# Patient Record
Sex: MALE | Race: WHITE
[De-identification: names, ages, dates, MRNs, and addresses within clinical notes are randomized per-mention and may not be internally consistent; named-entity substitution may affect disease eponyms.]

---

## 2019-05-02 ENCOUNTER — HOSPITAL ENCOUNTER (EMERGENCY)
Dept: HOSPITAL 95 - ER | Age: 44
LOS: 1 days | Discharge: HOME | End: 2019-05-03
Payer: MEDICAID

## 2019-05-02 VITALS — HEIGHT: 74 IN | WEIGHT: 209.99 LBS | BODY MASS INDEX: 26.95 KG/M2

## 2019-05-02 DIAGNOSIS — B37.0: Primary | ICD-10-CM

## 2019-05-02 DIAGNOSIS — Z79.899: ICD-10-CM

## 2019-05-02 DIAGNOSIS — F17.200: ICD-10-CM

## 2019-05-02 DIAGNOSIS — J02.9: ICD-10-CM

## 2021-12-03 ENCOUNTER — APPOINTMENT (OUTPATIENT)
Dept: RADIOLOGY | Facility: MEDICAL CENTER | Age: 46
DRG: 854 | End: 2021-12-03
Attending: EMERGENCY MEDICINE
Payer: MEDICAID

## 2021-12-03 ENCOUNTER — HOSPITAL ENCOUNTER (INPATIENT)
Facility: MEDICAL CENTER | Age: 46
LOS: 6 days | DRG: 854 | End: 2021-12-09
Attending: EMERGENCY MEDICINE | Admitting: STUDENT IN AN ORGANIZED HEALTH CARE EDUCATION/TRAINING PROGRAM
Payer: MEDICAID

## 2021-12-03 DIAGNOSIS — L03.119 CELLULITIS OF FOOT: ICD-10-CM

## 2021-12-03 DIAGNOSIS — M79.89 FOOT SWELLING: ICD-10-CM

## 2021-12-03 DIAGNOSIS — F29 PSYCHOSIS, UNSPECIFIED PSYCHOSIS TYPE (HCC): ICD-10-CM

## 2021-12-03 DIAGNOSIS — R00.0 TACHYCARDIA: ICD-10-CM

## 2021-12-03 DIAGNOSIS — M79.605 PAIN OF LEFT LOWER EXTREMITY: ICD-10-CM

## 2021-12-03 DIAGNOSIS — M54.50 ACUTE LEFT-SIDED LOW BACK PAIN WITHOUT SCIATICA: ICD-10-CM

## 2021-12-03 DIAGNOSIS — A41.9 SEPSIS, DUE TO UNSPECIFIED ORGANISM, UNSPECIFIED WHETHER ACUTE ORGAN DYSFUNCTION PRESENT (HCC): ICD-10-CM

## 2021-12-03 PROBLEM — E11.65 UNCONTROLLED DIABETES MELLITUS WITH HYPERGLYCEMIA (HCC): Status: ACTIVE | Noted: 2021-12-03

## 2021-12-03 PROBLEM — E87.20 LACTIC ACID ACIDOSIS: Status: ACTIVE | Noted: 2021-12-03

## 2021-12-03 PROBLEM — L03.90 CELLULITIS: Status: ACTIVE | Noted: 2021-12-03

## 2021-12-03 LAB
ALBUMIN SERPL BCP-MCNC: 2.6 G/DL (ref 3.2–4.9)
ALBUMIN/GLOB SERPL: 0.6 G/DL
ALP SERPL-CCNC: 93 U/L (ref 30–99)
ALT SERPL-CCNC: 25 U/L (ref 2–50)
AMPHET UR QL SCN: NEGATIVE
ANION GAP SERPL CALC-SCNC: 13 MMOL/L (ref 7–16)
APPEARANCE UR: ABNORMAL
AST SERPL-CCNC: 45 U/L (ref 12–45)
BACTERIA #/AREA URNS HPF: NEGATIVE /HPF
BARBITURATES UR QL SCN: NEGATIVE
BASOPHILS # BLD AUTO: 0.3 % (ref 0–1.8)
BASOPHILS # BLD: 0.03 K/UL (ref 0–0.12)
BENZODIAZ UR QL SCN: NEGATIVE
BILIRUB SERPL-MCNC: 0.2 MG/DL (ref 0.1–1.5)
BILIRUB UR QL STRIP.AUTO: NEGATIVE
BUN SERPL-MCNC: 22 MG/DL (ref 8–22)
BZE UR QL SCN: NEGATIVE
CALCIUM SERPL-MCNC: 8.1 MG/DL (ref 8.5–10.5)
CANNABINOIDS UR QL SCN: POSITIVE
CHLORIDE SERPL-SCNC: 104 MMOL/L (ref 96–112)
CO2 SERPL-SCNC: 19 MMOL/L (ref 20–33)
COLOR UR: ABNORMAL
CREAT SERPL-MCNC: 0.71 MG/DL (ref 0.5–1.4)
CRP SERPL HS-MCNC: 7.17 MG/DL (ref 0–0.75)
EOSINOPHIL # BLD AUTO: 0.31 K/UL (ref 0–0.51)
EOSINOPHIL NFR BLD: 3 % (ref 0–6.9)
EPI CELLS #/AREA URNS HPF: ABNORMAL /HPF
ERYTHROCYTE [DISTWIDTH] IN BLOOD BY AUTOMATED COUNT: 46 FL (ref 35.9–50)
ETHANOL BLD-MCNC: <10.1 MG/DL (ref 0–10)
GLOBULIN SER CALC-MCNC: 4.6 G/DL (ref 1.9–3.5)
GLUCOSE BLD-MCNC: 137 MG/DL (ref 65–99)
GLUCOSE BLD-MCNC: 182 MG/DL (ref 65–99)
GLUCOSE SERPL-MCNC: 203 MG/DL (ref 65–99)
GLUCOSE UR STRIP.AUTO-MCNC: NEGATIVE MG/DL
HCT VFR BLD AUTO: 34.9 % (ref 42–52)
HGB BLD-MCNC: 11.3 G/DL (ref 14–18)
HYALINE CASTS #/AREA URNS LPF: ABNORMAL /LPF
IMM GRANULOCYTES # BLD AUTO: 0.08 K/UL (ref 0–0.11)
IMM GRANULOCYTES NFR BLD AUTO: 0.8 % (ref 0–0.9)
KETONES UR STRIP.AUTO-MCNC: ABNORMAL MG/DL
LACTATE BLD-SCNC: 0.6 MMOL/L (ref 0.5–2)
LACTATE BLD-SCNC: 3.5 MMOL/L (ref 0.5–2)
LEUKOCYTE ESTERASE UR QL STRIP.AUTO: NEGATIVE
LYMPHOCYTES # BLD AUTO: 3.24 K/UL (ref 1–4.8)
LYMPHOCYTES NFR BLD: 31.9 % (ref 22–41)
MAGNESIUM SERPL-MCNC: 1.8 MG/DL (ref 1.5–2.5)
MCH RBC QN AUTO: 28.3 PG (ref 27–33)
MCHC RBC AUTO-ENTMCNC: 32.4 G/DL (ref 33.7–35.3)
MCV RBC AUTO: 87.5 FL (ref 81.4–97.8)
METHADONE UR QL SCN: NEGATIVE
MICRO URNS: ABNORMAL
MONOCYTES # BLD AUTO: 0.73 K/UL (ref 0–0.85)
MONOCYTES NFR BLD AUTO: 7.2 % (ref 0–13.4)
MUCOUS THREADS #/AREA URNS HPF: ABNORMAL /HPF
NEUTROPHILS # BLD AUTO: 5.78 K/UL (ref 1.82–7.42)
NEUTROPHILS NFR BLD: 56.8 % (ref 44–72)
NITRITE UR QL STRIP.AUTO: NEGATIVE
NRBC # BLD AUTO: 0 K/UL
NRBC BLD-RTO: 0 /100 WBC
OPIATES UR QL SCN: POSITIVE
OXYCODONE UR QL SCN: NEGATIVE
PCP UR QL SCN: NEGATIVE
PH UR STRIP.AUTO: 5 [PH] (ref 5–8)
PLATELET # BLD AUTO: 336 K/UL (ref 164–446)
PMV BLD AUTO: 10.4 FL (ref 9–12.9)
POTASSIUM SERPL-SCNC: 4.3 MMOL/L (ref 3.6–5.5)
PROCALCITONIN SERPL-MCNC: 0.18 NG/ML
PROCALCITONIN SERPL-MCNC: 0.19 NG/ML
PROPOXYPH UR QL SCN: NEGATIVE
PROT SERPL-MCNC: 7.2 G/DL (ref 6–8.2)
PROT UR QL STRIP: 100 MG/DL
RBC # BLD AUTO: 3.99 M/UL (ref 4.7–6.1)
RBC # URNS HPF: ABNORMAL /HPF
RBC UR QL AUTO: NEGATIVE
SARS-COV-2 RNA RESP QL NAA+PROBE: NOTDETECTED
SODIUM SERPL-SCNC: 136 MMOL/L (ref 135–145)
SP GR UR STRIP.AUTO: 1.03
SPECIMEN SOURCE: NORMAL
UROBILINOGEN UR STRIP.AUTO-MCNC: 1 MG/DL
WBC # BLD AUTO: 10.2 K/UL (ref 4.8–10.8)
WBC #/AREA URNS HPF: ABNORMAL /HPF

## 2021-12-03 PROCEDURE — A9270 NON-COVERED ITEM OR SERVICE: HCPCS | Performed by: INTERNAL MEDICINE

## 2021-12-03 PROCEDURE — 770006 HCHG ROOM/CARE - MED/SURG/GYN SEMI*

## 2021-12-03 PROCEDURE — 96365 THER/PROPH/DIAG IV INF INIT: CPT

## 2021-12-03 PROCEDURE — 700111 HCHG RX REV CODE 636 W/ 250 OVERRIDE (IP): Performed by: INTERNAL MEDICINE

## 2021-12-03 PROCEDURE — 83735 ASSAY OF MAGNESIUM: CPT

## 2021-12-03 PROCEDURE — 80307 DRUG TEST PRSMV CHEM ANLYZR: CPT

## 2021-12-03 PROCEDURE — 71045 X-RAY EXAM CHEST 1 VIEW: CPT

## 2021-12-03 PROCEDURE — 80053 COMPREHEN METABOLIC PANEL: CPT

## 2021-12-03 PROCEDURE — 73610 X-RAY EXAM OF ANKLE: CPT | Mod: LT

## 2021-12-03 PROCEDURE — 86140 C-REACTIVE PROTEIN: CPT

## 2021-12-03 PROCEDURE — U0003 INFECTIOUS AGENT DETECTION BY NUCLEIC ACID (DNA OR RNA); SEVERE ACUTE RESPIRATORY SYNDROME CORONAVIRUS 2 (SARS-COV-2) (CORONAVIRUS DISEASE [COVID-19]), AMPLIFIED PROBE TECHNIQUE, MAKING USE OF HIGH THROUGHPUT TECHNOLOGIES AS DESCRIBED BY CMS-2020-01-R: HCPCS

## 2021-12-03 PROCEDURE — 700105 HCHG RX REV CODE 258: Performed by: INTERNAL MEDICINE

## 2021-12-03 PROCEDURE — 85025 COMPLETE CBC W/AUTO DIFF WBC: CPT

## 2021-12-03 PROCEDURE — 73630 X-RAY EXAM OF FOOT: CPT | Mod: LT

## 2021-12-03 PROCEDURE — 700102 HCHG RX REV CODE 250 W/ 637 OVERRIDE(OP): Performed by: STUDENT IN AN ORGANIZED HEALTH CARE EDUCATION/TRAINING PROGRAM

## 2021-12-03 PROCEDURE — 96372 THER/PROPH/DIAG INJ SC/IM: CPT

## 2021-12-03 PROCEDURE — 700111 HCHG RX REV CODE 636 W/ 250 OVERRIDE (IP): Performed by: STUDENT IN AN ORGANIZED HEALTH CARE EDUCATION/TRAINING PROGRAM

## 2021-12-03 PROCEDURE — 96375 TX/PRO/DX INJ NEW DRUG ADDON: CPT

## 2021-12-03 PROCEDURE — 99285 EMERGENCY DEPT VISIT HI MDM: CPT

## 2021-12-03 PROCEDURE — 81001 URINALYSIS AUTO W/SCOPE: CPT

## 2021-12-03 PROCEDURE — 700102 HCHG RX REV CODE 250 W/ 637 OVERRIDE(OP): Performed by: INTERNAL MEDICINE

## 2021-12-03 PROCEDURE — 700105 HCHG RX REV CODE 258: Performed by: EMERGENCY MEDICINE

## 2021-12-03 PROCEDURE — 87086 URINE CULTURE/COLONY COUNT: CPT

## 2021-12-03 PROCEDURE — 84145 PROCALCITONIN (PCT): CPT | Mod: 91

## 2021-12-03 PROCEDURE — 700105 HCHG RX REV CODE 258: Performed by: STUDENT IN AN ORGANIZED HEALTH CARE EDUCATION/TRAINING PROGRAM

## 2021-12-03 PROCEDURE — 99223 1ST HOSP IP/OBS HIGH 75: CPT | Performed by: STUDENT IN AN ORGANIZED HEALTH CARE EDUCATION/TRAINING PROGRAM

## 2021-12-03 PROCEDURE — 82077 ASSAY SPEC XCP UR&BREATH IA: CPT

## 2021-12-03 PROCEDURE — 36415 COLL VENOUS BLD VENIPUNCTURE: CPT

## 2021-12-03 PROCEDURE — 87040 BLOOD CULTURE FOR BACTERIA: CPT

## 2021-12-03 PROCEDURE — 82962 GLUCOSE BLOOD TEST: CPT

## 2021-12-03 PROCEDURE — U0005 INFEC AGEN DETEC AMPLI PROBE: HCPCS

## 2021-12-03 PROCEDURE — 700111 HCHG RX REV CODE 636 W/ 250 OVERRIDE (IP): Performed by: EMERGENCY MEDICINE

## 2021-12-03 PROCEDURE — 83605 ASSAY OF LACTIC ACID: CPT | Mod: 91

## 2021-12-03 RX ORDER — DIPHENHYDRAMINE HYDROCHLORIDE 50 MG/ML
50 INJECTION INTRAMUSCULAR; INTRAVENOUS ONCE
Status: COMPLETED | OUTPATIENT
Start: 2021-12-03 | End: 2021-12-03

## 2021-12-03 RX ORDER — SODIUM CHLORIDE, SODIUM LACTATE, POTASSIUM CHLORIDE, CALCIUM CHLORIDE 600; 310; 30; 20 MG/100ML; MG/100ML; MG/100ML; MG/100ML
INJECTION, SOLUTION INTRAVENOUS CONTINUOUS
Status: DISCONTINUED | OUTPATIENT
Start: 2021-12-03 | End: 2021-12-04

## 2021-12-03 RX ORDER — MORPHINE SULFATE 4 MG/ML
4 INJECTION INTRAVENOUS ONCE
Status: COMPLETED | OUTPATIENT
Start: 2021-12-03 | End: 2021-12-03

## 2021-12-03 RX ORDER — LORAZEPAM 2 MG/ML
0.5 INJECTION INTRAMUSCULAR ONCE
Status: ACTIVE | OUTPATIENT
Start: 2021-12-03 | End: 2021-12-04

## 2021-12-03 RX ORDER — HALOPERIDOL 5 MG/ML
5 INJECTION INTRAMUSCULAR ONCE
Status: COMPLETED | OUTPATIENT
Start: 2021-12-03 | End: 2021-12-03

## 2021-12-03 RX ORDER — SODIUM CHLORIDE 9 MG/ML
2000 INJECTION, SOLUTION INTRAVENOUS ONCE
Status: COMPLETED | OUTPATIENT
Start: 2021-12-03 | End: 2021-12-03

## 2021-12-03 RX ORDER — OXYCODONE HYDROCHLORIDE 5 MG/1
5 TABLET ORAL EVERY 4 HOURS PRN
Status: DISCONTINUED | OUTPATIENT
Start: 2021-12-03 | End: 2021-12-03

## 2021-12-03 RX ORDER — KETOROLAC TROMETHAMINE 30 MG/ML
30 INJECTION, SOLUTION INTRAMUSCULAR; INTRAVENOUS EVERY 6 HOURS PRN
Status: DISPENSED | OUTPATIENT
Start: 2021-12-03 | End: 2021-12-08

## 2021-12-03 RX ORDER — HEPARIN SODIUM 5000 [USP'U]/ML
5000 INJECTION, SOLUTION INTRAVENOUS; SUBCUTANEOUS EVERY 8 HOURS
Status: DISCONTINUED | OUTPATIENT
Start: 2021-12-03 | End: 2021-12-09 | Stop reason: HOSPADM

## 2021-12-03 RX ORDER — LORAZEPAM 2 MG/ML
2 INJECTION INTRAMUSCULAR ONCE
Status: COMPLETED | OUTPATIENT
Start: 2021-12-03 | End: 2021-12-03

## 2021-12-03 RX ADMIN — SODIUM CHLORIDE, POTASSIUM CHLORIDE, SODIUM LACTATE AND CALCIUM CHLORIDE: 600; 310; 30; 20 INJECTION, SOLUTION INTRAVENOUS at 05:39

## 2021-12-03 RX ADMIN — KETOROLAC TROMETHAMINE 30 MG: 30 INJECTION, SOLUTION INTRAMUSCULAR; INTRAVENOUS at 21:29

## 2021-12-03 RX ADMIN — HALOPERIDOL LACTATE 5 MG: 5 INJECTION, SOLUTION INTRAMUSCULAR at 02:33

## 2021-12-03 RX ADMIN — VANCOMYCIN HYDROCHLORIDE 1750 MG: 500 INJECTION, POWDER, LYOPHILIZED, FOR SOLUTION INTRAVENOUS at 13:23

## 2021-12-03 RX ADMIN — MORPHINE SULFATE 4 MG: 4 INJECTION INTRAVENOUS at 02:33

## 2021-12-03 RX ADMIN — AMPICILLIN SODIUM AND SULBACTAM SODIUM 3 G: 2; 1 INJECTION, POWDER, FOR SOLUTION INTRAMUSCULAR; INTRAVENOUS at 02:35

## 2021-12-03 RX ADMIN — VANCOMYCIN HYDROCHLORIDE 750 MG: 500 INJECTION, POWDER, LYOPHILIZED, FOR SOLUTION INTRAVENOUS at 21:30

## 2021-12-03 RX ADMIN — HEPARIN SODIUM 5000 UNITS: 5000 INJECTION, SOLUTION INTRAVENOUS; SUBCUTANEOUS at 21:29

## 2021-12-03 RX ADMIN — LORAZEPAM 2 MG: 2 INJECTION INTRAMUSCULAR; INTRAVENOUS at 02:33

## 2021-12-03 RX ADMIN — HEPARIN SODIUM 5000 UNITS: 5000 INJECTION, SOLUTION INTRAVENOUS; SUBCUTANEOUS at 13:29

## 2021-12-03 RX ADMIN — DIPHENHYDRAMINE HYDROCHLORIDE 50 MG: 50 INJECTION INTRAMUSCULAR; INTRAVENOUS at 02:32

## 2021-12-03 RX ADMIN — AMPICILLIN SODIUM AND SULBACTAM SODIUM 3 G: 2; 1 INJECTION, POWDER, FOR SOLUTION INTRAMUSCULAR; INTRAVENOUS at 09:58

## 2021-12-03 RX ADMIN — AMPICILLIN SODIUM AND SULBACTAM SODIUM 3 G: 2; 1 INJECTION, POWDER, FOR SOLUTION INTRAMUSCULAR; INTRAVENOUS at 17:53

## 2021-12-03 RX ADMIN — SODIUM CHLORIDE 2000 ML: 9 INJECTION, SOLUTION INTRAVENOUS at 02:12

## 2021-12-03 RX ADMIN — HEPARIN SODIUM 5000 UNITS: 5000 INJECTION, SOLUTION INTRAVENOUS; SUBCUTANEOUS at 05:50

## 2021-12-03 ASSESSMENT — ENCOUNTER SYMPTOMS
EYE DISCHARGE: 0
PALPITATIONS: 0
FEVER: 0
NAUSEA: 0
ABDOMINAL PAIN: 0
MYALGIAS: 0
CHILLS: 0
ORTHOPNEA: 0
NECK PAIN: 0
VOMITING: 0
EYE PAIN: 0

## 2021-12-03 ASSESSMENT — LIFESTYLE VARIABLES
DO YOU DRINK ALCOHOL: YES
DOES PATIENT WANT TO STOP DRINKING: NO

## 2021-12-03 ASSESSMENT — PAIN DESCRIPTION - PAIN TYPE: TYPE: ACUTE PAIN

## 2021-12-03 NOTE — H&P
Hospital Medicine History & Physical Note    Date of Service  12/3/2021    Primary Care Physician  No primary care provider on file.    Consultants  None    Code Status  Full Code    Chief Complaint  Chief Complaint   Patient presents with   • Foot Pain   • Foot Swelling   • Psych Eval       History of Presenting Illness  Sultan Grady is a 89 y.o. male who presented 12/3/2021 with psychosis and lower leg wound.  Patient is currently sedated at bedside as he has been agitated and aggressive in the ED prior to my interview with him.  Unable to provide a reliable history at this time, history taken by ER chart.  Patient has a history of IV drug use.  He does not want to state his real name and birthdate.  He states that he has been discharged from [facility] sometime ago for pain discomfort in his left foot which he had surgery.  Initially in the ER, he was screaming that he had a very severe leg wound and that he was septic however provides no other history.  States that he has history of HIV and IBS.     In the ED, he was febrile and tachycardic.  Remarkable labs include normocytic anemia, sugar 203, lactic acid 3.5.  UA shows pyuria, however no suggestion of UTI.  CRP 7.17.  X-ray of the ankle shows soft tissue swelling of the ankle.  Patient was given fluids and Unasyn before being endorsed to medicine.    I discussed the plan of care with patient.    Review of Systems  ROS  Unable to assess due to acuity of condition    Past Medical History  Unable to assess due to acuity of condition    Surgical History  No pertinent surgical history      Family History   Family history reviewed with patient. There is no family history that is pertinent to the chief complaint.     Social History  Unable to assess due to acuity of condition       Allergies  NKDA    Medications  None       Physical Exam  Temp:  [37.3 °C (99.1 °F)-38.2 °C (100.7 °F)] 37.3 °C (99.1 °F)  Pulse:  [] 77  Resp:  [18-26] 18  BP:  ()/() 97/56  SpO2:  [91 %-98 %] 97 %  BP: 97/56   Temp: 37.3 °C (99.1 °F)   Pulse: 77   Resp: 18   SpO2: 97 %       Physical Exam  Constitutional:       Appearance: He is normal weight.      Comments: Sedated, unable to provide history, however does respond to questions with yes and no   HENT:      Head: Normocephalic.      Nose: Nose normal.      Mouth/Throat:      Mouth: Mucous membranes are moist.   Eyes:      Pupils: Pupils are equal, round, and reactive to light.   Cardiovascular:      Rate and Rhythm: Regular rhythm. Tachycardia present.      Pulses: Normal pulses.   Pulmonary:      Effort: Pulmonary effort is normal.      Breath sounds: Normal breath sounds.   Abdominal:      General: Abdomen is flat. Bowel sounds are normal.      Palpations: Abdomen is soft.   Musculoskeletal:      Cervical back: Normal range of motion.      Comments: Left lower extremity warm to touch.  Area of sutures and erythema noted on lateral aspect of left ankle.  No foul odor.  No drainage.  Not tender to touch.   Skin:     General: Skin is warm.   Neurological:      Comments: Sedated         Laboratory:  Recent Labs     12/03/21  0143   WBC 10.2   RBC 3.99*   HEMOGLOBIN 11.3*   HEMATOCRIT 34.9*   MCV 87.5   MCH 28.3   MCHC 32.4*   RDW 46.0   PLATELETCT 336   MPV 10.4     Recent Labs     12/03/21  0143   SODIUM 136   POTASSIUM 4.3   CHLORIDE 104   CO2 19*   GLUCOSE 203*   BUN 22   CREATININE 0.71   CALCIUM 8.1*     Recent Labs     12/03/21  0143   ALTSGPT 25   ASTSGOT 45   ALKPHOSPHAT 93   TBILIRUBIN 0.2   GLUCOSE 203*         No results for input(s): NTPROBNP in the last 72 hours.      No results for input(s): TROPONINT in the last 72 hours.    Imaging:  DX-ANKLE 3+ VIEWS LEFT   Final Result         1.  No acute traumatic bony injury.   2.  Soft tissue swelling of the ankle appear         DX-FOOT-COMPLETE 3+ LEFT   Final Result         1.  No acute traumatic bony injury.   2.  Forefoot soft tissue swung       DX-CHEST-PORTABLE (1 VIEW)   Final Result         1.  No focal infiltrates.   2.  Perihilar interstitial prominence and bronchial wall cuffing, appearance suggests changes of underlying bronchial inflammation, consider bronchitis.          no X-Ray or EKG requiring interpretation    Assessment/Plan:  I anticipate this patient is appropriate for observation status at this time.    * Cellulitis- (present on admission)  Assessment & Plan  Admit patient to medical floor  Unasyn  Fluids  Procalcitonin negative  CRP elevated  Pain medication  Blood cultures  Consider TTE for endocarditis as patient has hx of IVDU    Uncontrolled diabetes mellitus with hyperglycemia (HCC)  Assessment & Plan  RISS      Lactic acid acidosis  Assessment & Plan  From sepsis  Trend lactic acid  Fluids    Sepsis (HCC)  Assessment & Plan  This is Sepsis Present on admission  SIRS criteria identified on my evaluation include: Tachycardia, with heart rate greater than 90 BPM and Leukocytosis, with WBC greater than 12,000  Source is Cellulitis  Sepsis protocol initiated  Fluid resuscitation ordered per protocol  IV antibiotics as appropriate for source of sepsis  While organ dysfunction may be noted elsewhere in this problem list or in the chart, degree of organ dysfunction does not meet CMS criteria for severe sepsis              VTE prophylaxis: heparin ppx

## 2021-12-03 NOTE — ED NOTES
Pt removed IV from Rt forearm. Pt now laying on floor with his dog. Pt refusing to answer RN's questions.

## 2021-12-03 NOTE — ED NOTES
Pt resting in room, easy, equal chest rise and fall. Pt remains calm and cooperative. Admitting hospitalist at bedside.

## 2021-12-03 NOTE — ASSESSMENT & PLAN NOTE
Denies history of diabetes.  Hyperglycemic on admission  A1c ordered.  Continue SSI and accuchecks

## 2021-12-03 NOTE — ASSESSMENT & PLAN NOTE
Continue cefazolin  Procalcitonin negative  CRP elevated 7.17   Patient reports allergies to opiates.   Tylenol and Toradol for pain  Preliminary Blood cultures NGTD- continue to monitor.   Patient has had recent surgery on his left foot at [facility] but patient is a poor historian and rush provide more information, [facility] records pending  MRI left foot done- FHL sheath fluid may communicate with the medium-sized tibiotalar joint effusion  POD #1 s/p ID, hemovac in place

## 2021-12-03 NOTE — ED NOTES
Pt moved back to bed. Pt rehooked to monitor. Pt told RN that his dogs name is Rod and he is a Lab/Husky/Malamute mix. Pt states that his foot is hurting and would like some pain medications. Attending physician notified.

## 2021-12-03 NOTE — ED NOTES
Pt refused oxycodone stating he has allergies to opiates. Pt stated he does take morphine and dilaudid. When informed that morphine and dilaudid are also opiates, Pt stated that he tolerates them. Hospitalist informed.

## 2021-12-03 NOTE — ED NOTES
Report given to EDWINA Salas. Awaiting on bed in room prior to transport. Per admitting RN, dog  OK on floor.

## 2021-12-03 NOTE — ASSESSMENT & PLAN NOTE
This is Sepsis Present on admission  SIRS criteria identified on my evaluation include: Tachycardia, with heart rate greater than 90 BPM and Leukocytosis, with WBC greater than 12,000  Source is Cellulitis  Sepsis protocol initiated  Fluid resuscitation ordered per protocol  IV antibiotics as appropriate for source of sepsis  While organ dysfunction may be noted elsewhere in this problem list or in the chart, degree of organ dysfunction does not meet CMS criteria for severe sepsis    resolved

## 2021-12-03 NOTE — ED NOTES
Med rec unable to be obtained at this time, attempted to interview pt again at this time, pt not arousable to verbal stimuli to participate in interview.     No demographics or home pharmacy listed. Pt no longer under alias name.

## 2021-12-03 NOTE — ED NOTES
Pt sleeping on floor with his dog. Easy, equal chest rise and fall. Pt remains calm and cooperative.

## 2021-12-03 NOTE — ED TRIAGE NOTES
"Chief Complaint   Patient presents with   • Foot Pain   • Foot Swelling   • Psych Eval       BIB EMS to [facility], pt on monitor and in gown, labs drawn and sent. Pt called EMS reporting \"I have an infection in my blood! I have sepsis!\" Per EMS patient c/o of L foot pain and pt has stiches to L foot. Pt reports he has surgery but unable to state when. Pt unable to report anymore information. Pt became combative with EMS and EMS administered 200 of Ketamine. Pt had a fever of 101.2F, EMS administered 1000 mg of tylenol. Pt is A/O x 2, combative, yelling and unable to answer all questions. Upon arrival pt is hypertensive, has a fever and is tachycardic, and tachypenic. GCS 14.     Hx of HIV, IBS.    Medications given en route:200 of ketamine and tylenol 1000 mg PO    ERP at bedside. Security at bedside.    BP (!) 178/114   Pulse 120   Resp 22   Ht 1.778 m (5' 10\")   Wt 72.6 kg (160 lb)   SpO2 98%   BMI 22.96 kg/m²   "

## 2021-12-03 NOTE — ED NOTES
Pt sleeping in gurney with service dog, on monitor. Even and unlabored respirations, easily awakens to stimuli

## 2021-12-03 NOTE — PROGRESS NOTES
[facility] Medicine Daily Progress Note    Date of Service  12/3/2021    Chief Complaint  Hermann Ruffin is a 45 y.o. male admitted 12/3/2021 with foot pain and swelling     Hospital Course  This is a 44 y/o M who presented 12/3/2021 with lower leg wound.  Patient has a history of IV drug use He states that he has been discharged from [facility] sometime ago for pain discomfort in his left foot which he had surgery but will not elaborate why he had a surgery.    Patient here in ER found to have a lower extremity cellulitis around the area where previous surgery was done.  Patient admitted for further treatment.     Interval Problem Update  Patient seen and examined, resting in bed, poor historian. Complains of foot pain. No nausea or vomiting.  Cont on IV abx. Will check MRI of the foot.     I have personally seen and examined the patient at bedside. I discussed the plan of care with patient and bedside RN.    Consultants/Specialty  None     Code Status  Full Code    Disposition  Patient is not medically cleared.   Anticipate discharge to TBD .  I have placed the appropriate orders for post-discharge needs.    Review of Systems  Review of Systems   Constitutional: Negative for chills and fever.   HENT: Negative for ear discharge and ear pain.    Eyes: Negative for pain and discharge.   Cardiovascular: Negative for chest pain, palpitations and orthopnea.   Gastrointestinal: Negative for abdominal pain, nausea and vomiting.   Genitourinary: Negative for dysuria, frequency and urgency.   Musculoskeletal: Positive for joint pain. Negative for myalgias and neck pain.        Physical Exam  Temp:  [37.3 °C (99.1 °F)-38.2 °C (100.7 °F)] 37.3 °C (99.1 °F)  Pulse:  [] 63  Resp:  [18-26] 18  BP: ()/() 115/89  SpO2:  [91 %-100 %] 100 %    Physical Exam  Vitals and nursing note reviewed.   Constitutional:       Appearance: He is normal weight.      Comments: Sedated, unable to provide history, however  does respond to questions with yes and no   HENT:      Head: Normocephalic.      Nose: Nose normal.      Mouth/Throat:      Mouth: Mucous membranes are moist.   Eyes:      Pupils: Pupils are equal, round, and reactive to light.   Cardiovascular:      Rate and Rhythm: Regular rhythm. Tachycardia present.      Pulses: Normal pulses.   Pulmonary:      Effort: Pulmonary effort is normal.      Breath sounds: Normal breath sounds.   Abdominal:      General: Abdomen is flat. Bowel sounds are normal. There is no distension.      Palpations: Abdomen is soft.      Tenderness: There is no abdominal tenderness. There is no guarding or rebound.   Musculoskeletal:      Cervical back: Normal range of motion.      Comments: Left lower extremity warm to touch.  Area of sutures and erythema noted on lateral aspect of left ankle.  No foul odor.  No drainage.  Not tender to touch.   Skin:     General: Skin is warm.   Neurological:      Comments: Sedated         Fluids    Intake/Output Summary (Last 24 hours) at 12/3/2021 1032  Last data filed at 12/3/2021 0448  Gross per 24 hour   Intake 1000 ml   Output --   Net 1000 ml       Laboratory  Recent Labs     12/03/21  0143   WBC 10.2   RBC 3.99*   HEMOGLOBIN 11.3*   HEMATOCRIT 34.9*   MCV 87.5   MCH 28.3   MCHC 32.4*   RDW 46.0   PLATELETCT 336   MPV 10.4     Recent Labs     12/03/21  0143   SODIUM 136   POTASSIUM 4.3   CHLORIDE 104   CO2 19*   GLUCOSE 203*   BUN 22   CREATININE 0.71   CALCIUM 8.1*                   Imaging  DX-ANKLE 3+ VIEWS LEFT   Final Result         1.  No acute traumatic bony injury.   2.  Soft tissue swelling of the ankle appear         DX-FOOT-COMPLETE 3+ LEFT   Final Result         1.  No acute traumatic bony injury.   2.  Forefoot soft tissue swung      DX-CHEST-PORTABLE (1 VIEW)   Final Result         1.  No focal infiltrates.   2.  Perihilar interstitial prominence and bronchial wall cuffing, appearance suggests changes of underlying bronchial inflammation,  consider bronchitis.           Assessment/Plan  * Cellulitis- (present on admission)  Assessment & Plan  Cont on vancomycin and Unasyn   IV fluids   Procalcitonin negative  CRP elevated  Pain medication  Blood cultures  patient has had a surgery on his left foot at [facility] but patient is a poor historian and rush provide more information  Will request records from [facility].  In the mean time will order an MRI of the foot     Uncontrolled diabetes mellitus with hyperglycemia (HCC)  Assessment & Plan  Cont on SSI and accuchecks       Lactic acid acidosis  Assessment & Plan  From sepsis  Trended down       Sepsis (HCC)  Assessment & Plan  This is Sepsis Present on admission  SIRS criteria identified on my evaluation include: Tachycardia, with heart rate greater than 90 BPM and Leukocytosis, with WBC greater than 12,000  Source is Cellulitis  Sepsis protocol initiated  Fluid resuscitation ordered per protocol  IV antibiotics as appropriate for source of sepsis  While organ dysfunction may be noted elsewhere in this problem list or in the chart, degree of organ dysfunction does not meet CMS criteria for severe sepsis             VTE prophylaxis: heparin ppx    I have performed a physical exam and reviewed and updated ROS and Plan today (12/3/2021). In review of yesterday's note (12/2/2021), there are no changes except as documented above.

## 2021-12-03 NOTE — ED PROVIDER NOTES
"ED Provider Note    CHIEF COMPLAINT  Chief Complaint   Patient presents with   • Foot Pain   • Foot Swelling   • Psych Eval       HPI  Patient is a mid 40s-year-old male who presents emergency room brought in by EMS personnel for psychosis and concerns of possible sepsis.  The patient was reported screaming that he had \" a severe leg wound\" and that he had \"sepsis.\"  He reports that he has a history of IV drug use, has been in several hospitals and was told very left prior to completion of therapy.  He is unable to provide the name and birthdate it comes up in her system and is registered under a centimeter.  Patient states that he was discharged from [facility] and has had ongoing pain and discomfort in his left foot for which she has had surgery on.  He believes that he has an infection both in the wound and in his blood and has been irate.  Per EMS personnel he had a fever 101.2 and was noted to be tachycardic.  They administered 200 mg of ketamine and 1 g of Tylenol.  He is slightly agitated and aggressive but GCS is 15.  He reports having history of HIV and IBS.  Can provide no other health history.  Currently having moderate pain, he says he feels anxious, he says he needs \"help or also going to die.\"    PPE Note: I personally donned full PPE for all patient encounters during this visit, including being clean-shaven with an N95 respirator mask, gloves, and goggles.     REVIEW OF SYSTEMS  See HPI, limited review of systems due to the patient's tangentiality3    PAST MEDICAL HISTORY   unknown    SOCIAL HISTORY  Social History     Tobacco Use   • Smoking status: Not on file   • Smokeless tobacco: Not on file   Substance and Sexual Activity   • Alcohol use: Not on file   • Drug use: Not on file   • Sexual activity: Not on file     SURGICAL HISTORY  patient denies any surgical history    CURRENT MEDICATIONS  Home Medications    **Home medications have not yet been reviewed for this encounter**   " "    ALLERGIES  Not on File    PHYSICAL EXAM  VITAL SIGNS: BP 92/55   Pulse 91   Temp 38.2 °C (100.7 °F) (Temporal)   Resp 25   Ht 1.778 m (5' 10\")   Wt 72.6 kg (160 lb)   SpO2 95%   BMI 22.96 kg/m²    Pulse ox interpretation: I interpret this pulse ox as normal.  Genl: disheveled M, sitting in gurney uncomfortably, speaking is pressured, appears in moderate distress   Head: NC/AT   ENT: Mucous membranes dry, posterior pharynx clear, uvula midline, nares patent bilaterally   Eyes: Normal sclera, pupils equal round reactive to light  Neck: Supple, FROM, no LAD appreciated   Pulmonary: Lungs are clear to auscultation bilaterally  Chest: No TTP  CV:  tachycardia, no murmur appreciated, pulses 2+ in both upper and lower extremities,  Abdomen: soft, NT/ND; no rebound/guarding, no masses palpated, no HSM   : no CVA or suprapubic tenderness  Musculoskeletal: Pain free ROM of the neck. Moving upper and lower extremities and spontaneous in coordinated fashion  Left Lower Extremity  - Skin: Healing laceration sutures in place on the inferior aspect. Granulation tissue was also noted and swelling of the dorsum of the foot and global tenderness is appreciated. No abrasions, lacerations or ecchymosis  - Motor: Full range of motion of the hip and knee, limited range of motion of the ankle secondary to pain. Engages the flexors and extensors for dorsal and plantar flexion. EHL/FHL is intact   - Sensation intact to superficial/deep peroneal, tibial, saphenous, sural nerves  - 2+ dorsalis pedis and posterior tibialis, cap refill < 2 seconds x 5 digits  Neuro: A&Ox3 (person, place, situation), speech pressured but fluent, gait not assessed, no focal deficits appreciated, No cerebellar signs Sensation is grossly intact in the distal upper and lower extremities.  5/5 strength in  and dorsiflexion/plantar flexion of the ankles  Psych: Patient has pressured speech, tangentiality, no acute SI or HI, does not have any active " delusions.  Does appear to be responding to internal stimuli  Skin: No rash or lesions.  No pallor or jaundice.  No cyanosis.  Warm and dry.     COURSE & MEDICAL DECISION MAKING  DX-ANKLE 3+ VIEWS LEFT   Final Result         1.  No acute traumatic bony injury.   2.  Soft tissue swelling of the ankle appear         DX-FOOT-COMPLETE 3+ LEFT   Final Result         1.  No acute traumatic bony injury.   2.  Forefoot soft tissue swung      DX-CHEST-PORTABLE (1 VIEW)   Final Result         1.  No focal infiltrates.   2.  Perihilar interstitial prominence and bronchial wall cuffing, appearance suggests changes of underlying bronchial inflammation, consider bronchitis.        Labs Reviewed   LACTIC ACID - Abnormal; Notable for the following components:       Result Value    Lactic Acid 3.5 (*)     All other components within normal limits    Narrative:     Collected By:   CBC WITH DIFFERENTIAL - Abnormal; Notable for the following components:    RBC 3.99 (*)     Hemoglobin 11.3 (*)     Hematocrit 34.9 (*)     MCHC 32.4 (*)     All other components within normal limits    Narrative:     Collected By:   COMP METABOLIC PANEL - Abnormal; Notable for the following components:    Co2 19 (*)     Glucose 203 (*)     Calcium 8.1 (*)     Albumin 2.6 (*)     Globulin 4.6 (*)     All other components within normal limits    Narrative:     Collected By:   URINALYSIS - Abnormal; Notable for the following components:    Character Cloudy (*)     Ketones Trace (*)     Protein 100 (*)     All other components within normal limits    Narrative:     Collected By:  Indication for culture:->Evaluation for sepsis without a  clear source of infection   URINE DRUG SCREEN - Abnormal; Notable for the following components:    Opiates Positive (*)     Cannabinoid Metab Positive (*)     All other components within normal limits    Narrative:     Collected By:  Indication for culture:->Evaluation for sepsis without a  clear source of infection   CRP  "QUANTITIVE (NON-CARDIAC) - Abnormal; Notable for the following components:    Stat C-Reactive Protein 7.17 (*)     All other components within normal limits    Narrative:     Collected By:   URINE MICROSCOPIC (W/UA) - Abnormal; Notable for the following components:    WBC 10-20 (*)     RBC 2-5 (*)     Hyaline Cast 3-5 (*)     All other components within normal limits    Narrative:     Collected By:  Indication for culture:->Evaluation for sepsis without a  clear source of infection   LACTIC ACID    Narrative:     Collected By:  Repeat if initial lactic acid result is greater than 2   URINE CULTURE(NEW)    Narrative:     Collected By:  Indication for culture:->Evaluation for sepsis without a  clear source of infection   BLOOD CULTURE    Narrative:     Collected By:  Per Hospital Policy: Only change Specimen Src: to \"Line\" if  specified by physician order.   BLOOD CULTURE    Narrative:     Collected By:  Per Hospital Policy: Only change Specimen Src: to \"Line\" if  specified by physician order.   MAGNESIUM    Narrative:     Collected By:   PROCALCITONIN    Narrative:     Collected By:   PROCALCITONIN    Narrative:     Collected By:   ESTIMATED GFR    Narrative:     Collected By:   LACTIC ACID   DIAGNOSTIC ALCOHOL   SARS-COV-2, PCR (IN-HOUSE)       Pertinent Labs & Imaging studies reviewed. (See chart for details)    DDX: Sepsis, cellulitis, abscess, IV drug use, bacteremia, osteomyelitis, dehydration, polysubstance abuse, psychosis, psychiatric illness, alcohol intoxication, alcohol withdrawal    MDM    Initial evaluation at 0148:  Patient presents emergency room for symptoms as described above.  The patient presented to the emergency room stating that he had left an additional hospital with a diagnosis of sepsis.  The patient is tachycardic, febrile, very dehydrated and appears to have underlying psychiatric illness or altered mental status.  He does not have neck rigidity, he does not have headaches, he does not have " any focal neurological deficits but is aggressive and requires some sedation.  The patient was started empirically on fluids per sepsis protocol, initial dose of Zosyn as patient has a left lower extremity wound with erythema and warmth and possible prior surgical intervention.  X-rays obtained do not show reactive changes consistent with osteomyelitis, no clear abscess is noted but he does have soft tissue edema fitting with cellulitis.  While he does not have a white blood cell count CRP is elevated, initial lactate is greater than 3.    Patient has hyperglycemia, no elevated anion gap or bicarb is decreased.  He has anemia of 11.3 and 34.9 with nothing to compare to.  Blood cultures are obtained and currently pending.  I discussed the case with the hospitalist as patient has multiple risk factors, is a poor historian and unable to care for self and presented with septic etiology I believe he warrants being observed, continuation of antibiotics and trending of cultures.  Patient is accepted by Dr. Lange will admit the patient in guarded condition.    HYDRATION: Based on the patient's presentation of Dehydration, Sepsis and Tachycardia the patient was given IV fluids. IV Hydration was used because oral hydration was not adequate alone. Upon recheck following hydration, the patient was improved.    FINAL IMPRESSION  Visit Diagnoses     ICD-10-CM   1. Pain of left lower extremity  M79.605   2. Psychosis, unspecified psychosis type ([facility])  F29   3. Foot swelling  M79.89   4. Tachycardia  R00.0   5. Sepsis, due to unspecified organism, unspecified whether acute organ dysfunction present ([facility])  A41.9   6. Cellulitis of foot  L03.119     Electronically signed by: Omer Castillo M.D., 12/3/2021 1:50 AM

## 2021-12-03 NOTE — ED NOTES
RN faxed over medical records request to [facility] for records about Pt's most recent hospitalization regarding Lt foot surgery.

## 2021-12-03 NOTE — ED NOTES
Pt sleeping in room, easy, equal chest rise and fall. Pt remains calm and cooperative. RN provided Pt's dog with bowl of water.

## 2021-12-03 NOTE — ED NOTES
Med Rec Attempted, unable to complete at this time. Pt not arousable. Per RN, returning later may improve interview outcomes.    Pt did not wake for interview with verbal stimuli at this time. No demographics listed or home pharmacy. Pt under alias.

## 2021-12-04 ENCOUNTER — APPOINTMENT (OUTPATIENT)
Dept: RADIOLOGY | Facility: MEDICAL CENTER | Age: 46
DRG: 854 | End: 2021-12-04
Attending: INTERNAL MEDICINE
Payer: MEDICAID

## 2021-12-04 PROBLEM — D64.9 NORMOCYTIC ANEMIA: Status: ACTIVE | Noted: 2021-12-04

## 2021-12-04 PROBLEM — K58.0 IRRITABLE BOWEL SYNDROME WITH DIARRHEA: Status: ACTIVE | Noted: 2021-12-04

## 2021-12-04 PROBLEM — G62.9 NEUROPATHY: Status: ACTIVE | Noted: 2021-12-04

## 2021-12-04 PROBLEM — B20 HIV (HUMAN IMMUNODEFICIENCY VIRUS INFECTION) (HCC): Status: ACTIVE | Noted: 2021-12-04

## 2021-12-04 PROBLEM — E11.65 UNCONTROLLED DIABETES MELLITUS WITH HYPERGLYCEMIA (HCC): Status: RESOLVED | Noted: 2021-12-03 | Resolved: 2021-12-04

## 2021-12-04 PROBLEM — M54.50 ACUTE LEFT-SIDED LOW BACK PAIN WITHOUT SCIATICA: Status: ACTIVE | Noted: 2021-12-04

## 2021-12-04 PROBLEM — F19.10 POLYSUBSTANCE ABUSE (HCC): Status: ACTIVE | Noted: 2021-12-04

## 2021-12-04 PROBLEM — R73.9 HYPERGLYCEMIA: Status: ACTIVE | Noted: 2021-12-04

## 2021-12-04 LAB
ANION GAP SERPL CALC-SCNC: 8 MMOL/L (ref 7–16)
BASOPHILS # BLD AUTO: 0.4 % (ref 0–1.8)
BASOPHILS # BLD: 0.02 K/UL (ref 0–0.12)
BUN SERPL-MCNC: 9 MG/DL (ref 8–22)
CALCIUM SERPL-MCNC: 7.7 MG/DL (ref 8.5–10.5)
CHLORIDE SERPL-SCNC: 107 MMOL/L (ref 96–112)
CO2 SERPL-SCNC: 25 MMOL/L (ref 20–33)
CREAT SERPL-MCNC: 0.72 MG/DL (ref 0.5–1.4)
EOSINOPHIL # BLD AUTO: 0.22 K/UL (ref 0–0.51)
EOSINOPHIL NFR BLD: 3.9 % (ref 0–6.9)
ERYTHROCYTE [DISTWIDTH] IN BLOOD BY AUTOMATED COUNT: 46.9 FL (ref 35.9–50)
EST. AVERAGE GLUCOSE BLD GHB EST-MCNC: 126 MG/DL
FERRITIN SERPL-MCNC: 1005 NG/ML (ref 22–322)
GLUCOSE SERPL-MCNC: 84 MG/DL (ref 65–99)
HBA1C MFR BLD: 6 % (ref 4–5.6)
HCT VFR BLD AUTO: 35.3 % (ref 42–52)
HGB BLD-MCNC: 11 G/DL (ref 14–18)
HIV 1+2 AB+HIV1 P24 AG SERPL QL IA: ABNORMAL
IMM GRANULOCYTES # BLD AUTO: 0.07 K/UL (ref 0–0.11)
IMM GRANULOCYTES NFR BLD AUTO: 1.2 % (ref 0–0.9)
IRON SATN MFR SERPL: 41 % (ref 15–55)
IRON SERPL-MCNC: 77 UG/DL (ref 50–180)
LYMPHOCYTES # BLD AUTO: 1.83 K/UL (ref 1–4.8)
LYMPHOCYTES NFR BLD: 32.6 % (ref 22–41)
MCH RBC QN AUTO: 27.9 PG (ref 27–33)
MCHC RBC AUTO-ENTMCNC: 31.2 G/DL (ref 33.7–35.3)
MCV RBC AUTO: 89.6 FL (ref 81.4–97.8)
MONOCYTES # BLD AUTO: 0.46 K/UL (ref 0–0.85)
MONOCYTES NFR BLD AUTO: 8.2 % (ref 0–13.4)
MRSA DNA SPEC QL NAA+PROBE: NORMAL
NEUTROPHILS # BLD AUTO: 3.02 K/UL (ref 1.82–7.42)
NEUTROPHILS NFR BLD: 53.7 % (ref 44–72)
NRBC # BLD AUTO: 0 K/UL
NRBC BLD-RTO: 0 /100 WBC
PLATELET # BLD AUTO: 351 K/UL (ref 164–446)
PMV BLD AUTO: 9.9 FL (ref 9–12.9)
POTASSIUM SERPL-SCNC: 4.4 MMOL/L (ref 3.6–5.5)
RBC # BLD AUTO: 3.94 M/UL (ref 4.7–6.1)
SIGNIFICANT IND 70042: NORMAL
SITE SITE: NORMAL
SODIUM SERPL-SCNC: 140 MMOL/L (ref 135–145)
SOURCE SOURCE: NORMAL
TIBC SERPL-MCNC: 186 UG/DL (ref 250–450)
UIBC SERPL-MCNC: 109 UG/DL (ref 110–370)
VIT B12 SERPL-MCNC: 574 PG/ML (ref 211–911)
WBC # BLD AUTO: 5.6 K/UL (ref 4.8–10.8)

## 2021-12-04 PROCEDURE — 82728 ASSAY OF FERRITIN: CPT

## 2021-12-04 PROCEDURE — 82607 VITAMIN B-12: CPT

## 2021-12-04 PROCEDURE — 36415 COLL VENOUS BLD VENIPUNCTURE: CPT

## 2021-12-04 PROCEDURE — 83540 ASSAY OF IRON: CPT

## 2021-12-04 PROCEDURE — G0475 HIV COMBINATION ASSAY: HCPCS

## 2021-12-04 PROCEDURE — 83036 HEMOGLOBIN GLYCOSYLATED A1C: CPT

## 2021-12-04 PROCEDURE — 70450 CT HEAD/BRAIN W/O DYE: CPT

## 2021-12-04 PROCEDURE — 86360 T CELL ABSOLUTE COUNT/RATIO: CPT

## 2021-12-04 PROCEDURE — 86702 HIV-2 ANTIBODY: CPT

## 2021-12-04 PROCEDURE — 86359 T CELLS TOTAL COUNT: CPT

## 2021-12-04 PROCEDURE — 770006 HCHG ROOM/CARE - MED/SURG/GYN SEMI*

## 2021-12-04 PROCEDURE — 87536 HIV-1 QUANT&REVRSE TRNSCRPJ: CPT

## 2021-12-04 PROCEDURE — 80048 BASIC METABOLIC PNL TOTAL CA: CPT

## 2021-12-04 PROCEDURE — 87641 MR-STAPH DNA AMP PROBE: CPT

## 2021-12-04 PROCEDURE — 700111 HCHG RX REV CODE 636 W/ 250 OVERRIDE (IP): Performed by: INTERNAL MEDICINE

## 2021-12-04 PROCEDURE — 700105 HCHG RX REV CODE 258: Performed by: INTERNAL MEDICINE

## 2021-12-04 PROCEDURE — 83550 IRON BINDING TEST: CPT

## 2021-12-04 PROCEDURE — 86701 HIV-1ANTIBODY: CPT

## 2021-12-04 PROCEDURE — 700111 HCHG RX REV CODE 636 W/ 250 OVERRIDE (IP): Performed by: STUDENT IN AN ORGANIZED HEALTH CARE EDUCATION/TRAINING PROGRAM

## 2021-12-04 PROCEDURE — A9270 NON-COVERED ITEM OR SERVICE: HCPCS | Performed by: INTERNAL MEDICINE

## 2021-12-04 PROCEDURE — 94760 N-INVAS EAR/PLS OXIMETRY 1: CPT

## 2021-12-04 PROCEDURE — 700105 HCHG RX REV CODE 258: Performed by: STUDENT IN AN ORGANIZED HEALTH CARE EDUCATION/TRAINING PROGRAM

## 2021-12-04 PROCEDURE — 700102 HCHG RX REV CODE 250 W/ 637 OVERRIDE(OP): Performed by: INTERNAL MEDICINE

## 2021-12-04 PROCEDURE — 85025 COMPLETE CBC W/AUTO DIFF WBC: CPT

## 2021-12-04 PROCEDURE — 99232 SBSQ HOSP IP/OBS MODERATE 35: CPT | Performed by: INTERNAL MEDICINE

## 2021-12-04 RX ORDER — TRAZODONE HYDROCHLORIDE 50 MG/1
100 TABLET ORAL
Status: DISCONTINUED | OUTPATIENT
Start: 2021-12-04 | End: 2021-12-09 | Stop reason: HOSPADM

## 2021-12-04 RX ORDER — GABAPENTIN 300 MG/1
300 CAPSULE ORAL 3 TIMES DAILY
Status: DISCONTINUED | OUTPATIENT
Start: 2021-12-04 | End: 2021-12-06

## 2021-12-04 RX ORDER — LOPERAMIDE HYDROCHLORIDE 2 MG/1
2 CAPSULE ORAL 4 TIMES DAILY PRN
Status: DISCONTINUED | OUTPATIENT
Start: 2021-12-04 | End: 2021-12-09 | Stop reason: HOSPADM

## 2021-12-04 RX ORDER — ACETAMINOPHEN 325 MG/1
650 TABLET ORAL EVERY 4 HOURS PRN
Status: DISCONTINUED | OUTPATIENT
Start: 2021-12-04 | End: 2021-12-09 | Stop reason: HOSPADM

## 2021-12-04 RX ADMIN — AMPICILLIN SODIUM AND SULBACTAM SODIUM 3 G: 2; 1 INJECTION, POWDER, FOR SOLUTION INTRAMUSCULAR; INTRAVENOUS at 16:47

## 2021-12-04 RX ADMIN — VANCOMYCIN HYDROCHLORIDE 750 MG: 500 INJECTION, POWDER, LYOPHILIZED, FOR SOLUTION INTRAVENOUS at 06:30

## 2021-12-04 RX ADMIN — AMPICILLIN SODIUM AND SULBACTAM SODIUM 3 G: 2; 1 INJECTION, POWDER, FOR SOLUTION INTRAMUSCULAR; INTRAVENOUS at 09:02

## 2021-12-04 RX ADMIN — KETOROLAC TROMETHAMINE 30 MG: 30 INJECTION, SOLUTION INTRAMUSCULAR; INTRAVENOUS at 06:43

## 2021-12-04 RX ADMIN — TRAZODONE HYDROCHLORIDE 100 MG: 50 TABLET ORAL at 20:48

## 2021-12-04 RX ADMIN — VANCOMYCIN HYDROCHLORIDE 750 MG: 500 INJECTION, POWDER, LYOPHILIZED, FOR SOLUTION INTRAVENOUS at 23:06

## 2021-12-04 RX ADMIN — HEPARIN SODIUM 5000 UNITS: 5000 INJECTION, SOLUTION INTRAVENOUS; SUBCUTANEOUS at 06:30

## 2021-12-04 RX ADMIN — AMPICILLIN SODIUM AND SULBACTAM SODIUM 3 G: 2; 1 INJECTION, POWDER, FOR SOLUTION INTRAMUSCULAR; INTRAVENOUS at 20:48

## 2021-12-04 RX ADMIN — GABAPENTIN 300 MG: 300 CAPSULE ORAL at 12:01

## 2021-12-04 RX ADMIN — HEPARIN SODIUM 5000 UNITS: 5000 INJECTION, SOLUTION INTRAVENOUS; SUBCUTANEOUS at 20:55

## 2021-12-04 RX ADMIN — VANCOMYCIN HYDROCHLORIDE 750 MG: 500 INJECTION, POWDER, LYOPHILIZED, FOR SOLUTION INTRAVENOUS at 13:12

## 2021-12-04 RX ADMIN — AMPICILLIN SODIUM AND SULBACTAM SODIUM 3 G: 2; 1 INJECTION, POWDER, FOR SOLUTION INTRAMUSCULAR; INTRAVENOUS at 00:54

## 2021-12-04 RX ADMIN — GABAPENTIN 300 MG: 300 CAPSULE ORAL at 17:11

## 2021-12-04 ASSESSMENT — ENCOUNTER SYMPTOMS
DOUBLE VISION: 0
FLANK PAIN: 0
NAUSEA: 0
EYE PAIN: 0
BACK PAIN: 1
BLOOD IN STOOL: 0
SENSORY CHANGE: 1
ABDOMINAL PAIN: 0
INSOMNIA: 1
SORE THROAT: 0
TINGLING: 1
FALLS: 0
TREMORS: 0
CONSTIPATION: 0
COUGH: 0
ORTHOPNEA: 0
SPUTUM PRODUCTION: 0
FEVER: 0
DEPRESSION: 1
BLURRED VISION: 0
EYE DISCHARGE: 0
SHORTNESS OF BREATH: 0
HEADACHES: 0
POLYDIPSIA: 0
DIARRHEA: 1
DIZZINESS: 0
WEAKNESS: 0
NERVOUS/ANXIOUS: 1
CHILLS: 0
SEIZURES: 0
PALPITATIONS: 0
MEMORY LOSS: 1
VOMITING: 0

## 2021-12-04 ASSESSMENT — COGNITIVE AND FUNCTIONAL STATUS - GENERAL
SUGGESTED CMS G CODE MODIFIER DAILY ACTIVITY: CH
MOBILITY SCORE: 24
DAILY ACTIVITIY SCORE: 24
SUGGESTED CMS G CODE MODIFIER MOBILITY: CH

## 2021-12-04 ASSESSMENT — LIFESTYLE VARIABLES: SUBSTANCE_ABUSE: 1

## 2021-12-04 ASSESSMENT — PAIN DESCRIPTION - PAIN TYPE: TYPE: ACUTE PAIN

## 2021-12-04 NOTE — CARE PLAN
The patient is Stable - Low risk of patient condition declining or worsening    Shift Goals  Clinical Goals: Patient's pain will be controlled     Progress made toward(s) clinical / shift goals:  Patient complained of pain this shift, medicated with PRN pain medication per MAR.       Problem: Pain - Standard  Goal: Alleviation of pain or a reduction in pain to the patient’s comfort goal  Outcome: Progressing     Problem: Knowledge Deficit - Standard  Goal: Patient and family/care givers will demonstrate understanding of plan of care, disease process/condition, diagnostic tests and medications  Outcome: Progressing     Problem: Fluid Volume  Goal: Fluid volume balance will be maintained  Outcome: Progressing
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
## 2021-12-04 NOTE — ASSESSMENT & PLAN NOTE
UDS on admission +cannabinoid and opiates.   Denies opiate use.  Reports last use methamphetamines 2 weeks prior to admission.  ETOH negative  Encourage cessation  Monitor for withdrawals.

## 2021-12-04 NOTE — PROGRESS NOTES
Refuses full assessment from this RN this afternoon, admission and admission skin assessment. Informed charge RN.

## 2021-12-04 NOTE — ASSESSMENT & PLAN NOTE
Possibly due to recent surgery.  Ferritin/iron studies/B12 completed- appears due to chronic disease.   Follow intermittently

## 2021-12-04 NOTE — ASSESSMENT & PLAN NOTE
Burning/tingling sensation to bilateral lower extremities.  Decreased sensation to left foot  Continue gabapentin

## 2021-12-04 NOTE — CARE PLAN
The patient is Watcher - Medium risk of patient condition declining or worsening    Shift Goals  Clinical Goals: safety, IV antibiotics  Patient Goals: pain control, rest     Progress made toward(s) clinical / shift goals:    Problem: Pain - Standard  Goal: Alleviation of pain or a reduction in pain to the patient’s comfort goal  Outcome: Progressing     Problem: Knowledge Deficit - Standard  Goal: Patient and family/care givers will demonstrate understanding of plan of care, disease process/condition, diagnostic tests and medications  Outcome: Progressing       Patient is not progressing towards the following goals:

## 2021-12-04 NOTE — PROGRESS NOTES
Report received. Assumed care. Pt in bed awake. Service dog at bedside. A/O x 4. VSS. Responds appropriately. C/O pain, medicated per MAR. Denies SOB. Assessment complete. L foot incision with sutures, edema noted. Discussed POC, pt verbalizes understanding. Explained importance of calling before getting OOB. Call light and belongings within reach. Bed in the lowest position. Treaded socks in place. Hourly rounding in progress. Will continue to monitor.

## 2021-12-04 NOTE — ASSESSMENT & PLAN NOTE
Reported history of HIV  Has not taken ARV's since approximately 2018  HIV Ag/Ab, CD4/CD8, HIV NAAT   ID following  Pt advised to follow up at [facility]
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
## 2021-12-04 NOTE — PROGRESS NOTES
Assumed care of patient this shift. Patient is alert and oriented x 4. Able to make his needs known.  Up independently in room and to bathroom. Up with assist of Fall prevention tactics in place, bed locked and in lowest position and non-skid footwear in use.

## 2021-12-04 NOTE — CARE PLAN
The patient is Stable - Low risk of patient condition declining or worsening    Shift Goals  Clinical Goals: admission / skin assessment / nrsg assessment, MRI  Patient Goals: rest    Progress made toward(s) clinical / shift goals:  resting, awaiting MRI      Problem: Pain - Standard  Goal: Alleviation of pain or a reduction in pain to the patient’s comfort goal  Outcome: Progressing     Problem: Hemodynamics  Goal: Patient's hemodynamics, fluid balance and neurologic status will be stable or improve  Outcome: Progressing     Problem: Fluid Volume  Goal: Fluid volume balance will be maintained  Outcome: Progressing     Problem: Urinary - Renal Perfusion  Goal: Ability to achieve and maintain adequate renal perfusion and functioning will improve  Outcome: Progressing     Problem: Respiratory  Goal: Patient will achieve/maintain optimum respiratory ventilation and gas exchange  Outcome: Progressing       Patient is not progressing towards the following goals: refusing admission / skin assessment / nrsg assessment      Problem: Knowledge Deficit - Standard  Goal: Patient and family/care givers will demonstrate understanding of plan of care, disease process/condition, diagnostic tests and medications  Outcome: Not Progressing

## 2021-12-05 ENCOUNTER — APPOINTMENT (OUTPATIENT)
Dept: RADIOLOGY | Facility: MEDICAL CENTER | Age: 46
DRG: 854 | End: 2021-12-05
Attending: INTERNAL MEDICINE
Payer: MEDICAID

## 2021-12-05 LAB
ANION GAP SERPL CALC-SCNC: 8 MMOL/L (ref 7–16)
BACTERIA UR CULT: NORMAL
BASOPHILS # BLD AUTO: 0.4 % (ref 0–1.8)
BASOPHILS # BLD: 0.03 K/UL (ref 0–0.12)
BUN SERPL-MCNC: 10 MG/DL (ref 8–22)
CALCIUM SERPL-MCNC: 8.3 MG/DL (ref 8.5–10.5)
CHLORIDE SERPL-SCNC: 106 MMOL/L (ref 96–112)
CO2 SERPL-SCNC: 23 MMOL/L (ref 20–33)
CREAT SERPL-MCNC: 0.72 MG/DL (ref 0.5–1.4)
EOSINOPHIL # BLD AUTO: 0.14 K/UL (ref 0–0.51)
EOSINOPHIL NFR BLD: 2 % (ref 0–6.9)
ERYTHROCYTE [DISTWIDTH] IN BLOOD BY AUTOMATED COUNT: 48 FL (ref 35.9–50)
GLUCOSE SERPL-MCNC: 94 MG/DL (ref 65–99)
HCT VFR BLD AUTO: 33 % (ref 42–52)
HGB BLD-MCNC: 10 G/DL (ref 14–18)
IMM GRANULOCYTES # BLD AUTO: 0.11 K/UL (ref 0–0.11)
IMM GRANULOCYTES NFR BLD AUTO: 1.6 % (ref 0–0.9)
LYMPHOCYTES # BLD AUTO: 1.54 K/UL (ref 1–4.8)
LYMPHOCYTES NFR BLD: 21.8 % (ref 22–41)
MCH RBC QN AUTO: 27.9 PG (ref 27–33)
MCHC RBC AUTO-ENTMCNC: 30.3 G/DL (ref 33.7–35.3)
MCV RBC AUTO: 91.9 FL (ref 81.4–97.8)
MONOCYTES # BLD AUTO: 0.61 K/UL (ref 0–0.85)
MONOCYTES NFR BLD AUTO: 8.7 % (ref 0–13.4)
NEUTROPHILS # BLD AUTO: 4.62 K/UL (ref 1.82–7.42)
NEUTROPHILS NFR BLD: 65.5 % (ref 44–72)
NRBC # BLD AUTO: 0 K/UL
NRBC BLD-RTO: 0 /100 WBC
PLATELET # BLD AUTO: 361 K/UL (ref 164–446)
PMV BLD AUTO: 9.3 FL (ref 9–12.9)
POTASSIUM SERPL-SCNC: 4.4 MMOL/L (ref 3.6–5.5)
RBC # BLD AUTO: 3.59 M/UL (ref 4.7–6.1)
SIGNIFICANT IND 70042: NORMAL
SITE SITE: NORMAL
SODIUM SERPL-SCNC: 137 MMOL/L (ref 135–145)
SOURCE SOURCE: NORMAL
VANCOMYCIN PEAK SERPL-MCNC: 17.4 UG/ML (ref 20–40)
VANCOMYCIN TROUGH SERPL-MCNC: 7.4 UG/ML (ref 10–20)
WBC # BLD AUTO: 7.1 K/UL (ref 4.8–10.8)

## 2021-12-05 PROCEDURE — 85025 COMPLETE CBC W/AUTO DIFF WBC: CPT

## 2021-12-05 PROCEDURE — A9270 NON-COVERED ITEM OR SERVICE: HCPCS | Performed by: INTERNAL MEDICINE

## 2021-12-05 PROCEDURE — 700117 HCHG RX CONTRAST REV CODE 255: Performed by: INTERNAL MEDICINE

## 2021-12-05 PROCEDURE — 36415 COLL VENOUS BLD VENIPUNCTURE: CPT

## 2021-12-05 PROCEDURE — 700111 HCHG RX REV CODE 636 W/ 250 OVERRIDE (IP): Performed by: INTERNAL MEDICINE

## 2021-12-05 PROCEDURE — 80048 BASIC METABOLIC PNL TOTAL CA: CPT

## 2021-12-05 PROCEDURE — 700105 HCHG RX REV CODE 258: Performed by: INTERNAL MEDICINE

## 2021-12-05 PROCEDURE — 73720 MRI LWR EXTREMITY W/O&W/DYE: CPT | Mod: LT

## 2021-12-05 PROCEDURE — 700105 HCHG RX REV CODE 258: Performed by: STUDENT IN AN ORGANIZED HEALTH CARE EDUCATION/TRAINING PROGRAM

## 2021-12-05 PROCEDURE — 99232 SBSQ HOSP IP/OBS MODERATE 35: CPT | Performed by: INTERNAL MEDICINE

## 2021-12-05 PROCEDURE — 700102 HCHG RX REV CODE 250 W/ 637 OVERRIDE(OP): Performed by: INTERNAL MEDICINE

## 2021-12-05 PROCEDURE — A9576 INJ PROHANCE MULTIPACK: HCPCS | Performed by: INTERNAL MEDICINE

## 2021-12-05 PROCEDURE — 770006 HCHG ROOM/CARE - MED/SURG/GYN SEMI*

## 2021-12-05 PROCEDURE — 80202 ASSAY OF VANCOMYCIN: CPT

## 2021-12-05 PROCEDURE — 700111 HCHG RX REV CODE 636 W/ 250 OVERRIDE (IP): Performed by: STUDENT IN AN ORGANIZED HEALTH CARE EDUCATION/TRAINING PROGRAM

## 2021-12-05 RX ORDER — LORAZEPAM 2 MG/ML
1 INJECTION INTRAMUSCULAR
Status: COMPLETED | OUTPATIENT
Start: 2021-12-05 | End: 2021-12-05

## 2021-12-05 RX ADMIN — GABAPENTIN 300 MG: 300 CAPSULE ORAL at 17:51

## 2021-12-05 RX ADMIN — GABAPENTIN 300 MG: 300 CAPSULE ORAL at 12:25

## 2021-12-05 RX ADMIN — VANCOMYCIN HYDROCHLORIDE 750 MG: 500 INJECTION, POWDER, LYOPHILIZED, FOR SOLUTION INTRAVENOUS at 05:56

## 2021-12-05 RX ADMIN — HEPARIN SODIUM 5000 UNITS: 5000 INJECTION, SOLUTION INTRAVENOUS; SUBCUTANEOUS at 13:36

## 2021-12-05 RX ADMIN — AMPICILLIN SODIUM AND SULBACTAM SODIUM 3 G: 2; 1 INJECTION, POWDER, FOR SOLUTION INTRAMUSCULAR; INTRAVENOUS at 11:12

## 2021-12-05 RX ADMIN — VANCOMYCIN HYDROCHLORIDE 750 MG: 500 INJECTION, POWDER, LYOPHILIZED, FOR SOLUTION INTRAVENOUS at 13:36

## 2021-12-05 RX ADMIN — GADOTERIDOL 15 ML: 279.3 INJECTION, SOLUTION INTRAVENOUS at 19:17

## 2021-12-05 RX ADMIN — VANCOMYCIN HYDROCHLORIDE 750 MG: 500 INJECTION, POWDER, LYOPHILIZED, FOR SOLUTION INTRAVENOUS at 22:33

## 2021-12-05 RX ADMIN — GABAPENTIN 300 MG: 300 CAPSULE ORAL at 05:57

## 2021-12-05 RX ADMIN — AMPICILLIN SODIUM AND SULBACTAM SODIUM 3 G: 2; 1 INJECTION, POWDER, FOR SOLUTION INTRAMUSCULAR; INTRAVENOUS at 16:00

## 2021-12-05 RX ADMIN — AMPICILLIN SODIUM AND SULBACTAM SODIUM 3 G: 2; 1 INJECTION, POWDER, FOR SOLUTION INTRAMUSCULAR; INTRAVENOUS at 02:54

## 2021-12-05 RX ADMIN — AMPICILLIN SODIUM AND SULBACTAM SODIUM 3 G: 2; 1 INJECTION, POWDER, FOR SOLUTION INTRAMUSCULAR; INTRAVENOUS at 20:19

## 2021-12-05 RX ADMIN — HEPARIN SODIUM 5000 UNITS: 5000 INJECTION, SOLUTION INTRAVENOUS; SUBCUTANEOUS at 05:56

## 2021-12-05 RX ADMIN — LORAZEPAM 1 MG: 2 INJECTION INTRAMUSCULAR; INTRAVENOUS at 18:13

## 2021-12-05 RX ADMIN — TRAZODONE HYDROCHLORIDE 100 MG: 50 TABLET ORAL at 21:22

## 2021-12-05 ASSESSMENT — ENCOUNTER SYMPTOMS
DEPRESSION: 1
BLURRED VISION: 0
TINGLING: 1
FOCAL WEAKNESS: 0
FALLS: 0
DIARRHEA: 1
VOMITING: 0
FEVER: 0
TREMORS: 0
WEAKNESS: 0
SEIZURES: 0
CHILLS: 0
COUGH: 0
NAUSEA: 0
CONSTIPATION: 0
POLYDIPSIA: 0
SHORTNESS OF BREATH: 0
ABDOMINAL PAIN: 0
SPEECH CHANGE: 0
HEADACHES: 0
BLOOD IN STOOL: 0
NERVOUS/ANXIOUS: 1
MEMORY LOSS: 1
EYE DISCHARGE: 0
FLANK PAIN: 0
SPUTUM PRODUCTION: 0
SORE THROAT: 0
BACK PAIN: 1
SENSORY CHANGE: 1
ORTHOPNEA: 0
DIZZINESS: 0
EYE PAIN: 0
DOUBLE VISION: 0
INSOMNIA: 1
PALPITATIONS: 0

## 2021-12-05 ASSESSMENT — LIFESTYLE VARIABLES: SUBSTANCE_ABUSE: 1

## 2021-12-05 ASSESSMENT — PAIN DESCRIPTION - PAIN TYPE: TYPE: ACUTE PAIN

## 2021-12-05 NOTE — PROGRESS NOTES
"Report received. Assumed care. Pt in bed awake,  dog at bedside. A/O x4. VSS. Responds appropriately. Denies pain, denies SOB on RA. Assessment complete. L foot swollen, sutures in place, site bleeding slightly. Discussed POC, pt verbalizes understanding. Explained importance of calling before getting OOB. Call light and belongings within reach. Bed in the lowest position. Treaded socks in place. Hourly rounding in progress. Will continue to monitor.    2130 pt c/o seeing \"strobe lights\" and stuttering. Pupils equal and reactive to light. Notified MD. DUNCAN at bedside. MD ordered head CT. Will continue to monitor.     2300 Pt to CT via wheelchair with transport without incident.   2320 Pt back to floor via wheelchair with transport without incident.   "

## 2021-12-05 NOTE — CARE PLAN
The patient is Stable - Low risk of patient condition declining or worsening    Shift Goals  Clinical Goals: pain control, rest   Patient Goals: rest     Progress made toward(s) clinical / shift goals:    Problem: Pain - Standard  Goal: Alleviation of pain or a reduction in pain to the patient’s comfort goal  Outcome: Progressing     Problem: Knowledge Deficit - Standard  Goal: Patient and family/care givers will demonstrate understanding of plan of care, disease process/condition, diagnostic tests and medications  Outcome: Progressing       Patient is not progressing towards the following goals:
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
## 2021-12-05 NOTE — PROGRESS NOTES
Assumed care of patient this shift. Patient is alert and oriented x 4. Able to make his needs known.  Up independently in room and to bathroom. Fall prevention tactics in place, bed locked and in lowest position and non-skid footwear in use. Plan of care discussed with patient and call light is within reach.

## 2021-12-06 LAB
ANION GAP SERPL CALC-SCNC: 9 MMOL/L (ref 7–16)
BASOPHILS # BLD AUTO: 0.3 % (ref 0–1.8)
BASOPHILS # BLD: 0.02 K/UL (ref 0–0.12)
BUN SERPL-MCNC: 12 MG/DL (ref 8–22)
CALCIUM SERPL-MCNC: 8.2 MG/DL (ref 8.5–10.5)
CHLORIDE SERPL-SCNC: 103 MMOL/L (ref 96–112)
CO2 SERPL-SCNC: 24 MMOL/L (ref 20–33)
CREAT SERPL-MCNC: 0.73 MG/DL (ref 0.5–1.4)
CRP SERPL HS-MCNC: 0.99 MG/DL (ref 0–0.75)
EKG IMPRESSION: NORMAL
EOSINOPHIL # BLD AUTO: 0.06 K/UL (ref 0–0.51)
EOSINOPHIL NFR BLD: 0.9 % (ref 0–6.9)
ERYTHROCYTE [DISTWIDTH] IN BLOOD BY AUTOMATED COUNT: 45.4 FL (ref 35.9–50)
ERYTHROCYTE [SEDIMENTATION RATE] IN BLOOD BY WESTERGREN METHOD: 92 MM/HOUR (ref 0–20)
EXTERNAL QUALITY CONTROL: NORMAL
GLUCOSE SERPL-MCNC: 94 MG/DL (ref 65–99)
HCT VFR BLD AUTO: 31.6 % (ref 42–52)
HGB BLD-MCNC: 10 G/DL (ref 14–18)
IMM GRANULOCYTES # BLD AUTO: 0.11 K/UL (ref 0–0.11)
IMM GRANULOCYTES NFR BLD AUTO: 1.7 % (ref 0–0.9)
LYMPHOCYTES # BLD AUTO: 1.49 K/UL (ref 1–4.8)
LYMPHOCYTES NFR BLD: 22.7 % (ref 22–41)
MCH RBC QN AUTO: 27.7 PG (ref 27–33)
MCHC RBC AUTO-ENTMCNC: 31.6 G/DL (ref 33.7–35.3)
MCV RBC AUTO: 87.5 FL (ref 81.4–97.8)
MONOCYTES # BLD AUTO: 0.65 K/UL (ref 0–0.85)
MONOCYTES NFR BLD AUTO: 9.9 % (ref 0–13.4)
NEUTROPHILS # BLD AUTO: 4.22 K/UL (ref 1.82–7.42)
NEUTROPHILS NFR BLD: 64.5 % (ref 44–72)
NRBC # BLD AUTO: 0 K/UL
NRBC BLD-RTO: 0 /100 WBC
PLATELET # BLD AUTO: 384 K/UL (ref 164–446)
PMV BLD AUTO: 9 FL (ref 9–12.9)
POTASSIUM SERPL-SCNC: 4.4 MMOL/L (ref 3.6–5.5)
RBC # BLD AUTO: 3.61 M/UL (ref 4.7–6.1)
SARS-COV+SARS-COV-2 AG RESP QL IA.RAPID: NEGATIVE
SODIUM SERPL-SCNC: 136 MMOL/L (ref 135–145)
WBC # BLD AUTO: 6.6 K/UL (ref 4.8–10.8)

## 2021-12-06 PROCEDURE — 85652 RBC SED RATE AUTOMATED: CPT

## 2021-12-06 PROCEDURE — C9803 HOPD COVID-19 SPEC COLLECT: HCPCS | Performed by: STUDENT IN AN ORGANIZED HEALTH CARE EDUCATION/TRAINING PROGRAM

## 2021-12-06 PROCEDURE — 86140 C-REACTIVE PROTEIN: CPT

## 2021-12-06 PROCEDURE — 36415 COLL VENOUS BLD VENIPUNCTURE: CPT

## 2021-12-06 PROCEDURE — 700101 HCHG RX REV CODE 250: Performed by: INTERNAL MEDICINE

## 2021-12-06 PROCEDURE — 700102 HCHG RX REV CODE 250 W/ 637 OVERRIDE(OP): Performed by: INTERNAL MEDICINE

## 2021-12-06 PROCEDURE — 700111 HCHG RX REV CODE 636 W/ 250 OVERRIDE (IP): Performed by: STUDENT IN AN ORGANIZED HEALTH CARE EDUCATION/TRAINING PROGRAM

## 2021-12-06 PROCEDURE — 700111 HCHG RX REV CODE 636 W/ 250 OVERRIDE (IP): Performed by: INTERNAL MEDICINE

## 2021-12-06 PROCEDURE — 99254 IP/OBS CNSLTJ NEW/EST MOD 60: CPT | Performed by: INTERNAL MEDICINE

## 2021-12-06 PROCEDURE — 770006 HCHG ROOM/CARE - MED/SURG/GYN SEMI*

## 2021-12-06 PROCEDURE — 700105 HCHG RX REV CODE 258: Performed by: INTERNAL MEDICINE

## 2021-12-06 PROCEDURE — A9270 NON-COVERED ITEM OR SERVICE: HCPCS | Performed by: INTERNAL MEDICINE

## 2021-12-06 PROCEDURE — 85025 COMPLETE CBC W/AUTO DIFF WBC: CPT

## 2021-12-06 PROCEDURE — 700105 HCHG RX REV CODE 258: Performed by: STUDENT IN AN ORGANIZED HEALTH CARE EDUCATION/TRAINING PROGRAM

## 2021-12-06 PROCEDURE — 700102 HCHG RX REV CODE 250 W/ 637 OVERRIDE(OP): Performed by: NURSE PRACTITIONER

## 2021-12-06 PROCEDURE — A9270 NON-COVERED ITEM OR SERVICE: HCPCS | Performed by: NURSE PRACTITIONER

## 2021-12-06 PROCEDURE — 93010 ELECTROCARDIOGRAM REPORT: CPT | Performed by: INTERNAL MEDICINE

## 2021-12-06 PROCEDURE — 93005 ELECTROCARDIOGRAM TRACING: CPT | Performed by: STUDENT IN AN ORGANIZED HEALTH CARE EDUCATION/TRAINING PROGRAM

## 2021-12-06 PROCEDURE — 80048 BASIC METABOLIC PNL TOTAL CA: CPT

## 2021-12-06 PROCEDURE — 99254 IP/OBS CNSLTJ NEW/EST MOD 60: CPT | Mod: 57 | Performed by: STUDENT IN AN ORGANIZED HEALTH CARE EDUCATION/TRAINING PROGRAM

## 2021-12-06 PROCEDURE — 99233 SBSQ HOSP IP/OBS HIGH 50: CPT | Performed by: INTERNAL MEDICINE

## 2021-12-06 RX ORDER — CEFAZOLIN SODIUM 2 G/100ML
2 INJECTION, SOLUTION INTRAVENOUS EVERY 8 HOURS
Status: DISCONTINUED | OUTPATIENT
Start: 2021-12-06 | End: 2021-12-09 | Stop reason: HOSPADM

## 2021-12-06 RX ORDER — GABAPENTIN 400 MG/1
400 CAPSULE ORAL 3 TIMES DAILY
Status: DISCONTINUED | OUTPATIENT
Start: 2021-12-06 | End: 2021-12-09 | Stop reason: HOSPADM

## 2021-12-06 RX ADMIN — DEXTROSE MONOHYDRATE 2 G: 50 INJECTION, SOLUTION INTRAVENOUS at 20:22

## 2021-12-06 RX ADMIN — AMPICILLIN SODIUM AND SULBACTAM SODIUM 3 G: 2; 1 INJECTION, POWDER, FOR SOLUTION INTRAMUSCULAR; INTRAVENOUS at 09:40

## 2021-12-06 RX ADMIN — VANCOMYCIN HYDROCHLORIDE 750 MG: 500 INJECTION, POWDER, LYOPHILIZED, FOR SOLUTION INTRAVENOUS at 05:14

## 2021-12-06 RX ADMIN — GABAPENTIN 300 MG: 300 CAPSULE ORAL at 11:58

## 2021-12-06 RX ADMIN — TRAZODONE HYDROCHLORIDE 100 MG: 50 TABLET ORAL at 20:22

## 2021-12-06 RX ADMIN — GABAPENTIN 300 MG: 300 CAPSULE ORAL at 05:14

## 2021-12-06 RX ADMIN — GABAPENTIN 400 MG: 400 CAPSULE ORAL at 18:28

## 2021-12-06 RX ADMIN — KETOROLAC TROMETHAMINE 30 MG: 30 INJECTION, SOLUTION INTRAMUSCULAR; INTRAVENOUS at 20:21

## 2021-12-06 RX ADMIN — AMPICILLIN SODIUM AND SULBACTAM SODIUM 3 G: 2; 1 INJECTION, POWDER, FOR SOLUTION INTRAMUSCULAR; INTRAVENOUS at 03:51

## 2021-12-06 ASSESSMENT — ENCOUNTER SYMPTOMS
SPUTUM PRODUCTION: 0
FALLS: 0
MEMORY LOSS: 1
NAUSEA: 0
DEPRESSION: 1
BLOOD IN STOOL: 0
TINGLING: 1
FLANK PAIN: 0
EYE DISCHARGE: 0
BACK PAIN: 0
BLURRED VISION: 0
CONSTIPATION: 0
VOMITING: 0
DOUBLE VISION: 0
SENSORY CHANGE: 1
SORE THROAT: 0
COUGH: 0
ABDOMINAL PAIN: 0
DIZZINESS: 0
EYE PAIN: 0
SEIZURES: 0
SPEECH CHANGE: 0
DIARRHEA: 1
FOCAL WEAKNESS: 0
SHORTNESS OF BREATH: 0
PALPITATIONS: 0
NERVOUS/ANXIOUS: 1
HEADACHES: 0
CHILLS: 0
FEVER: 0
POLYDIPSIA: 0
TREMORS: 0
ORTHOPNEA: 0
WEAKNESS: 0

## 2021-12-06 ASSESSMENT — LIFESTYLE VARIABLES: SUBSTANCE_ABUSE: 1

## 2021-12-06 ASSESSMENT — PAIN DESCRIPTION - PAIN TYPE
TYPE: ACUTE PAIN
TYPE: ACUTE PAIN

## 2021-12-06 NOTE — PROGRESS NOTES
Report received. Assumed care. Pt in bed awake, watching tv. A/O x 4. VSS. Responds appropriately. Denies pain, denies SOB on RA. Assessment complete. L foot incision with sutures in place, tight edema, sutures bleeding slightly. Pt reports urgency and frequency with urination, will pass onto day shift. Discussed POC, pt verbalizes understanding. Explained importance of calling before getting OOB. Call light and belongings within reach. Bed in the lowest position. Treaded socks in place. Hourly rounding in progress. Will continue to monitor.

## 2021-12-06 NOTE — H&P
Surgery Orthopedic History & Physical Note    Date  12/6/2021    Primary Care Physician  No primary care provider on file.    CC  Left ankle pain    HPI  This is a 45 y.o. male with a PMH of HIV, polysubstance abuse, and homelessess who presented with left ankle pain. He recently underwent an Irrigation and Debridement at [facility] last week. Cultures have grown strep. Pyogenes. He left AMA on 12/03/21 and was not on antibiotics for a few days. He reports worsening severe ankle pain since surgery. He has not had any drainage since surgery but reports worsening redness and swelling. CRP when admitted was 7.17. He had a mild tachycardia and leucocytosis upon presentation but has not had any further signs of infection.       PMH  HIV  Polysubstance abuse  History of noncompliance            Current Facility-Administered Medications   Medication Dose Route Frequency Provider Last Rate Last Admin   • gabapentin (NEURONTIN) capsule 400 mg  400 mg Oral TID Evelina Denise, A.P.R.N.       • acetaminophen (Tylenol) tablet 650 mg  650 mg Oral Q4HRS PRN Mariza Beltran M.D.       • loperamide (IMODIUM) capsule 2 mg  2 mg Oral 4X/DAY PRN Evelina Denise A.P.R.N.       • traZODone (DESYREL) tablet 100 mg  100 mg Oral QHS Timoteo Bowie M.D.   100 mg at 12/05/21 2122   • heparin injection 5,000 Units  5,000 Units Subcutaneous Q8HRS Ralph Lange M.D.   5,000 Units at 12/05/21 1336   • insulin regular (HumuLIN R,NovoLIN R) injection  3-15 Units Subcutaneous Q6HRS Ralph Lange M.D.       • ketorolac (TORADOL) injection 30 mg  30 mg Intravenous Q6HRS PRN Lauren Beltran M.D.   30 mg at 12/04/21 0643       Social History     Socioeconomic History   • Marital status: Unknown     Spouse name: Not on file   • Number of children: Not on file   • Years of education: Not on file   • Highest education level: Not on file   Occupational History   • Not on file   Tobacco Use   • Smoking status: Not on file   • Smokeless tobacco:  Not on file   Substance and Sexual Activity   • Alcohol use: Not on file   • Drug use: Not on file   • Sexual activity: Not on file   Other Topics Concern   • Not on file   Social History Narrative   • Not on file     Social Determinants of Health     Financial Resource Strain:    • Difficulty of Paying Living Expenses: Not on file   Food Insecurity:    • Worried About Running Out of Food in the Last Year: Not on file   • Ran Out of Food in the Last Year: Not on file   Transportation Needs:    • Lack of Transportation (Medical): Not on file   • Lack of Transportation (Non-Medical): Not on file   Physical Activity:    • Days of Exercise per Week: Not on file   • Minutes of Exercise per Session: Not on file   Stress:    • Feeling of Stress : Not on file   Social Connections:    • Frequency of Communication with Friends and Family: Not on file   • Frequency of Social Gatherings with Friends and Family: Not on file   • Attends Synagogue Services: Not on file   • Active Member of Clubs or Organizations: Not on file   • Attends Club or Organization Meetings: Not on file   • Marital Status: Not on file   Intimate Partner Violence:    • Fear of Current or Ex-Partner: Not on file   • Emotionally Abused: Not on file   • Physically Abused: Not on file   • Sexually Abused: Not on file   Housing Stability:    • Unable to Pay for Housing in the Last Year: Not on file   • Number of Places Lived in the Last Year: Not on file   • Unstable Housing in the Last Year: Not on file       No family history on file.    Allergies  Patient has no allergy information on record.    Review of Systems  Negative except for HIV, methamphetamine use    Physical Exam  Alert, answering questions appropriately  Normal work of breathing on room air  Appropriate affect and behavior  Left Ankle: sutures in place to anterolateral ankle incision. Fluctuance underneath incision with surrounding cellulitis. Tender over incision, anterior and medial ankle.  Ankle ROM limited to 20 degrees. Toes warm and well perfused. Palpable pulses.       Vital Signs  Blood Pressure: 128/73   Temperature: 37.3 °C (99.1 °F)   Pulse: 99   Respiration: 19   Pulse Oximetry: 98 %       Labs:  Recent Labs     12/04/21  0350 12/05/21  0749 12/06/21  0629   WBC 5.6 7.1 6.6   RBC 3.94* 3.59* 3.61*   HEMOGLOBIN 11.0* 10.0* 10.0*   HEMATOCRIT 35.3* 33.0* 31.6*   MCV 89.6 91.9 87.5   MCH 27.9 27.9 27.7   MCHC 31.2* 30.3* 31.6*   RDW 46.9 48.0 45.4   PLATELETCT 351 361 384   MPV 9.9 9.3 9.0     Recent Labs     12/04/21  0350 12/05/21  0749 12/06/21  0629   SODIUM 140 137 136   POTASSIUM 4.4 4.4 4.4   CHLORIDE 107 106 103   CO2 25 23 24   GLUCOSE 84 94 94   BUN 9 10 12   CREATININE 0.72 0.72 0.73   CALCIUM 7.7* 8.3* 8.2*               Radiology:  MR-FOOT-WITH & W/O LEFT   Final Result      Flexor hallucis longus, extensor digitorum rim-enhancing sheath fluid could be bland or septic and there is artifact superficial to the extensor sheath fluid indicating probable area of recent operative intervention according to provided history. This    makes this more likely to be infected although could also just be postoperative, in healing      Flexor hallucis longus sheath fluid may communicate with the medium-sized tibiotalar joint effusion. Septic nature is possible and aspiration could clarify. There is no evidence of bony destruction at this time to indicate osteomyelitis      Medial first metatarsal head sesamoid bipartite nature with mild edema most likely from contusion or sesamoiditis. This is favored over a nonunited fracture      Cellulitis and phlegmonous change, greatest at the lateral ankle      CT-HEAD W/O   Final Result         1. No acute intracranial abnormality. No evidence of acute intracranial hemorrhage or mass lesion.                     DX-ANKLE 3+ VIEWS LEFT   Final Result         1.  No acute traumatic bony injury.   2.  Soft tissue swelling of the ankle appear          DX-FOOT-COMPLETE 3+ LEFT   Final Result         1.  No acute traumatic bony injury.   2.  Forefoot soft tissue swung      DX-CHEST-PORTABLE (1 VIEW)   Final Result         1.  No focal infiltrates.   2.  Perihilar interstitial prominence and bronchial wall cuffing, appearance suggests changes of underlying bronchial inflammation, consider bronchitis.            Assessment/Plan:  Left Ankle Soft Tissue Infection  Probable left septic ankle       I had a discussion with the patient regarding symptoms and exam findings. He has worsening pain status post O&D at [facility] last week. His exam and MRI imaging are notable for persistent infection. I am recommending repeat irrigation and debridement in the OR today. We discussed the risks of surgery including but not limited to pain, stiffness, infection, blood loss, nerve injury, tendon injury, DVT, and serious cardiopulmonary events related to anesthesia. He will remain NPO at this time in anticipation of surgery. He should receive IV antibiotics per the Infectious Disease team.
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
## 2021-12-06 NOTE — PROGRESS NOTES
Pharmacy Vancomycin Kinetics Note for 12/6/2021     45 y.o. male on Vancomycin day # 3     Vancomycin Indication (Two level/Trough based Dosing): Skin/skin structure infection (goal trough 10-15)    Provider specified end date: 12/10/21    Active Antibiotics (From admission, onward)    Ordered     Ordering Provider       Mon Dec 6, 2021  8:59 AM    12/06/21 0859  vancomycin (VANCOCIN) 1,000 mg in  mL IVPB  (vancomycin (VANCOCIN) IV (LD + Maintenance))  EVERY 8 HOURS         Mariza Beltran M.D.       Fri Dec 3, 2021  9:13 AM    12/03/21 0913  ampicillin/sulbactam (UNASYN) 3 g in  mL IVPB  EVERY 6 HOURS         Ralph Lange M.D.       Fri Dec 3, 2021  8:59 AM    12/03/21 0859  MD Alert...Vancomycin per Pharmacy  PHARMACY TO DOSE        Question:  Indication(s) for vancomycin?  Answer:  Skin and soft tissue infection    Lauren Beltran M.D.          Dosing Weight: 72.6 kg (160 lb 0.9 oz)      Admission History: Admitted on 12/3/2021 for Cellulitis [L03.90]  Pertinent history: Patient presents with SSTI or leg with purulence and PMH IVDU. Empiric antibiotics no cultures collected.    Allergies:     Patient has no allergy information on record.     Pertinent cultures to date:     Results     Procedure Component Value Units Date/Time    URINE CULTURE(NEW) [[id number]] Collected: 12/03/21 0346    Order Status: Completed Specimen: Urine Updated: 12/05/21 0816     Significant Indicator NEG     Source UR     Site -     Culture Result No growth at 48 hours.    Narrative:      Collected By:  Indication for culture:->Evaluation for sepsis without a  clear source of infection  Collected By:    MRSA By PCR (Amp) [[id number]] Collected: 12/04/21 1035    Order Status: Completed Specimen: Respirate from Nares Updated: 12/04/21 1910     Significant Indicator NEG     Source RESP     Site NARES     MRSA PCR Negative for MRSA by PCR.    Narrative:      Collected By:  Collected By:    BLOOD CULTURE [[id number]] Collected: 12/03/21  "0215    Order Status: Completed Specimen: Blood from Peripheral Updated: 12/04/21 0744     Significant Indicator NEG     Source BLD     Site PERIPHERAL     Culture Result No Growth  Note: Blood cultures are incubated for 5 days and  are monitored continuously.Positive blood cultures  are called to the RN and reported as soon as  they are identified.      Narrative:      Collected By:  Per Hospital Policy: Only change Specimen Src: to \"Line\" if  specified by physician order.  Right Forearm/Arm    BLOOD CULTURE [[id number]] Collected: 12/03/21 0229    Order Status: Completed Specimen: Blood from Peripheral Updated: 12/04/21 0744     Significant Indicator NEG     Source BLD     Site PERIPHERAL     Culture Result No Growth  Note: Blood cultures are incubated for 5 days and  are monitored continuously.Positive blood cultures  are called to the RN and reported as soon as  they are identified.      Narrative:      Collected By:  Per Hospital Policy: Only change Specimen Src: to \"Line\" if  specified by physician order.  Left Hand    SARS-CoV-2 PCR (24 hour In-House): Collect NP swab in [facility] [[id number]] Collected: 12/03/21 0510    Order Status: Completed Specimen: Respirate from Mid-Turbinate Updated: 12/03/21 1717     SARS-CoV-2 Source NP Swab     SARS-CoV-2 by PCR NotDetected     Comment: PATIENTS: Important information regarding your results and instructions can  be found at [email or web address]   \"After your  Covid-19 Test\"    [facility] providers: PLEASE REFER TO DE-ESCALATION AND RETESTING PROTOCOL  on [email or web address]    **The Roche SARS-CoV-2 RT-PCR test has been made available for use under the  Emergency Use Authorization (EUA) only.         Narrative:      Collected By:  Have you been in close contact with a person who is suspected  or known to be positive for COVID-19 within the last 30 days  (e.g. last seen that person < 30 days ago)->Unknown    URINALYSIS [[id number]]  (Abnormal) Collected: " "21 0346    Order Status: Completed Specimen: Urine Updated: 21     Color DK Yellow     Character Cloudy     Specific Gravity 1.030     Ph 5.0     Glucose Negative mg/dL      Ketones Trace mg/dL      Protein 100 mg/dL      Bilirubin Negative     Urobilinogen, Urine 1.0     Nitrite Negative     Leukocyte Esterase Negative     Occult Blood Negative     Micro Urine Req Microscopic    Narrative:      Collected By:  Indication for culture:->Evaluation for sepsis without a  clear source of infection          Labs:     Estimated Creatinine Clearance: 132.6 mL/min (by C-G formula based on SCr of 0.73 mg/dL).  Recent Labs     21  0350 21  0749 21   WBC 5.6 7.1 6.6   NEUTSPOLYS 53.70 65.50 64.50     Recent Labs     21  0350 21  0749 21   BUN 9 10 12   CREATININE 0.72 0.72 0.73       Intake/Output Summary (Last 24 hours) at 2021 0859  Last data filed at 2021 1700  Gross per 24 hour   Intake 960 ml   Output --   Net 960 ml      /73   Pulse 99   Temp 37.3 °C (99.1 °F) (Temporal)   Resp 19   Ht 1.778 m (5' 10\")   Wt 72.6 kg (160 lb)   SpO2 98%  Temp (24hrs), Av.4 °C (99.4 °F), Min:37 °C (98.6 °F), Max:37.8 °C (100.1 °F)      List concerns for Vancomycin clearance:     Malnutrition/Low albumin    Pharmacokinetics:  Two level kinetics:   Ke (hr ^-1): 0.1921  Half life: 3.6  Vd: Steady state Vd : 42.494  Calculated AUC: 273 mg·hr/L    Trough kinetics:   Recent Labs     21  1558 21   VANCOTROUGH  --  7.4*   VANCOPEAK 17.4*  --        A/P:     -  Vancomycin dose: Increase to 1000 mg q12h    -  Next vancomycin level(s):    -None, unless therapy extended     -  Predicted vancomycin AUC from two level test calculator: 364 mg·hr/L    -  Comments: AUC and trough subtherapeutic with previous regimen, increased dose which should provide an estimated trough ~10, although predicted AUC subtherapeutic. Renal function stable no overt " concerns for accumulation at this time.    Homero EstesD

## 2021-12-06 NOTE — CARE PLAN
The patient is Stable - Low risk of patient condition declining or worsening    Shift Goals  Clinical Goals: pain control, IV antibiotics, rest  Patient Goals: rest, iv antibiotics    Progress made toward(s) clinical / shift goals:    Problem: Pain - Standard  Goal: Alleviation of pain or a reduction in pain to the patient’s comfort goal  Outcome: Progressing     Problem: Knowledge Deficit - Standard  Goal: Patient and family/care givers will demonstrate understanding of plan of care, disease process/condition, diagnostic tests and medications  Outcome: Progressing       Patient is not progressing towards the following goals:

## 2021-12-07 ENCOUNTER — ANESTHESIA EVENT (OUTPATIENT)
Dept: SURGERY | Facility: MEDICAL CENTER | Age: 46
DRG: 854 | End: 2021-12-07
Payer: MEDICAID

## 2021-12-07 ENCOUNTER — ANESTHESIA (OUTPATIENT)
Dept: SURGERY | Facility: MEDICAL CENTER | Age: 46
DRG: 854 | End: 2021-12-07
Payer: MEDICAID

## 2021-12-07 LAB
ANION GAP SERPL CALC-SCNC: 9 MMOL/L (ref 7–16)
ANNOTATION COMMENT IMP: ABNORMAL
BASOPHILS # BLD AUTO: 0.4 % (ref 0–1.8)
BASOPHILS # BLD: 0.03 K/UL (ref 0–0.12)
BUN SERPL-MCNC: 18 MG/DL (ref 8–22)
CALCIUM SERPL-MCNC: 8.4 MG/DL (ref 8.5–10.5)
CD3 CELLS # BLD: 1501 CELLS/UL (ref 570–2400)
CD3+CD4+ CELLS # BLD: 228 CELLS/UL (ref 430–1800)
CD3+CD4+ CELLS/CD3+CD8+ CLL BLD: 0.18 RATIO (ref 0.8–3.9)
CD3+CD8+ CELLS # BLD: 1236 CELLS/UL (ref 210–1200)
CHLORIDE SERPL-SCNC: 101 MMOL/L (ref 96–112)
CO2 SERPL-SCNC: 22 MMOL/L (ref 20–33)
CREAT SERPL-MCNC: 0.89 MG/DL (ref 0.5–1.4)
EOSINOPHIL # BLD AUTO: 0.11 K/UL (ref 0–0.51)
EOSINOPHIL NFR BLD: 1.5 % (ref 0–6.9)
ERYTHROCYTE [DISTWIDTH] IN BLOOD BY AUTOMATED COUNT: 46.5 FL (ref 35.9–50)
GLUCOSE BLD-MCNC: 101 MG/DL (ref 65–99)
GLUCOSE SERPL-MCNC: 97 MG/DL (ref 65–99)
GRAM STN SPEC: NORMAL
HCT VFR BLD AUTO: 36.7 % (ref 42–52)
HGB BLD-MCNC: 11.4 G/DL (ref 14–18)
IMM GRANULOCYTES # BLD AUTO: 0.2 K/UL (ref 0–0.11)
IMM GRANULOCYTES NFR BLD AUTO: 2.7 % (ref 0–0.9)
LYMPHOCYTES # BLD AUTO: 2.02 K/UL (ref 1–4.8)
LYMPHOCYTES NFR BLD: 26.9 % (ref 22–41)
MCH RBC QN AUTO: 28 PG (ref 27–33)
MCHC RBC AUTO-ENTMCNC: 31.1 G/DL (ref 33.7–35.3)
MCV RBC AUTO: 90.2 FL (ref 81.4–97.8)
MONOCYTES # BLD AUTO: 0.63 K/UL (ref 0–0.85)
MONOCYTES NFR BLD AUTO: 8.4 % (ref 0–13.4)
NEUTROPHILS # BLD AUTO: 4.51 K/UL (ref 1.82–7.42)
NEUTROPHILS NFR BLD: 60.1 % (ref 44–72)
NRBC # BLD AUTO: 0 K/UL
NRBC BLD-RTO: 0 /100 WBC
PLATELET # BLD AUTO: 390 K/UL (ref 164–446)
PMV BLD AUTO: 8.6 FL (ref 9–12.9)
POTASSIUM SERPL-SCNC: 5.2 MMOL/L (ref 3.6–5.5)
RBC # BLD AUTO: 4.07 M/UL (ref 4.7–6.1)
SIGNIFICANT IND 70042: NORMAL
SITE SITE: NORMAL
SODIUM SERPL-SCNC: 132 MMOL/L (ref 135–145)
SOURCE SOURCE: NORMAL
WBC # BLD AUTO: 7.5 K/UL (ref 4.8–10.8)

## 2021-12-07 PROCEDURE — 500881 HCHG PACK, EXTREMITY: Performed by: ORTHOPAEDIC SURGERY

## 2021-12-07 PROCEDURE — 27620 EXPLORE/TREAT ANKLE JOINT: CPT | Mod: ASROC,LT | Performed by: NURSE PRACTITIONER

## 2021-12-07 PROCEDURE — A9270 NON-COVERED ITEM OR SERVICE: HCPCS | Performed by: NURSE PRACTITIONER

## 2021-12-07 PROCEDURE — 99232 SBSQ HOSP IP/OBS MODERATE 35: CPT | Performed by: HOSPITALIST

## 2021-12-07 PROCEDURE — 160002 HCHG RECOVERY MINUTES (STAT): Performed by: ORTHOPAEDIC SURGERY

## 2021-12-07 PROCEDURE — 85025 COMPLETE CBC W/AUTO DIFF WBC: CPT

## 2021-12-07 PROCEDURE — A6223 GAUZE >16<=48 NO W/SAL W/O B: HCPCS | Performed by: ORTHOPAEDIC SURGERY

## 2021-12-07 PROCEDURE — 501330 HCHG SET, CYSTO IRRIG TUBING: Performed by: ORTHOPAEDIC SURGERY

## 2021-12-07 PROCEDURE — 700111 HCHG RX REV CODE 636 W/ 250 OVERRIDE (IP): Performed by: HOSPITALIST

## 2021-12-07 PROCEDURE — 160048 HCHG OR STATISTICAL LEVEL 1-5: Performed by: ORTHOPAEDIC SURGERY

## 2021-12-07 PROCEDURE — 160038 HCHG SURGERY MINUTES - EA ADDL 1 MIN LEVEL 2: Performed by: ORTHOPAEDIC SURGERY

## 2021-12-07 PROCEDURE — 87102 FUNGUS ISOLATION CULTURE: CPT

## 2021-12-07 PROCEDURE — 87075 CULTR BACTERIA EXCEPT BLOOD: CPT

## 2021-12-07 PROCEDURE — 501838 HCHG SUTURE GENERAL: Performed by: ORTHOPAEDIC SURGERY

## 2021-12-07 PROCEDURE — 700105 HCHG RX REV CODE 258: Performed by: INTERNAL MEDICINE

## 2021-12-07 PROCEDURE — 700102 HCHG RX REV CODE 250 W/ 637 OVERRIDE(OP): Performed by: NURSE PRACTITIONER

## 2021-12-07 PROCEDURE — 87205 SMEAR GRAM STAIN: CPT | Mod: 91

## 2021-12-07 PROCEDURE — 0LBT0ZZ EXCISION OF LEFT ANKLE TENDON, OPEN APPROACH: ICD-10-PCS | Performed by: ORTHOPAEDIC SURGERY

## 2021-12-07 PROCEDURE — 700105 HCHG RX REV CODE 258: Performed by: ANESTHESIOLOGY

## 2021-12-07 PROCEDURE — 82962 GLUCOSE BLOOD TEST: CPT

## 2021-12-07 PROCEDURE — 160035 HCHG PACU - 1ST 60 MINS PHASE I: Performed by: ORTHOPAEDIC SURGERY

## 2021-12-07 PROCEDURE — 700111 HCHG RX REV CODE 636 W/ 250 OVERRIDE (IP): Performed by: ANESTHESIOLOGY

## 2021-12-07 PROCEDURE — 770006 HCHG ROOM/CARE - MED/SURG/GYN SEMI*

## 2021-12-07 PROCEDURE — 27620 EXPLORE/TREAT ANKLE JOINT: CPT | Mod: LT | Performed by: ORTHOPAEDIC SURGERY

## 2021-12-07 PROCEDURE — 700111 HCHG RX REV CODE 636 W/ 250 OVERRIDE (IP): Performed by: INTERNAL MEDICINE

## 2021-12-07 PROCEDURE — 700101 HCHG RX REV CODE 250: Performed by: ANESTHESIOLOGY

## 2021-12-07 PROCEDURE — 160009 HCHG ANES TIME/MIN: Performed by: ORTHOPAEDIC SURGERY

## 2021-12-07 PROCEDURE — 87070 CULTURE OTHR SPECIMN AEROBIC: CPT

## 2021-12-07 PROCEDURE — 700102 HCHG RX REV CODE 250 W/ 637 OVERRIDE(OP): Performed by: INTERNAL MEDICINE

## 2021-12-07 PROCEDURE — 27680 RELEASE OF LOWER LEG TENDON: CPT | Mod: ASROC,59,LT | Performed by: NURSE PRACTITIONER

## 2021-12-07 PROCEDURE — 80048 BASIC METABOLIC PNL TOTAL CA: CPT

## 2021-12-07 PROCEDURE — A9270 NON-COVERED ITEM OR SERVICE: HCPCS | Performed by: INTERNAL MEDICINE

## 2021-12-07 PROCEDURE — 160027 HCHG SURGERY MINUTES - 1ST 30 MINS LEVEL 2: Performed by: ORTHOPAEDIC SURGERY

## 2021-12-07 PROCEDURE — 27680 RELEASE OF LOWER LEG TENDON: CPT | Mod: 59,LT | Performed by: ORTHOPAEDIC SURGERY

## 2021-12-07 PROCEDURE — 700101 HCHG RX REV CODE 250: Performed by: HOSPITALIST

## 2021-12-07 PROCEDURE — 700101 HCHG RX REV CODE 250: Performed by: INTERNAL MEDICINE

## 2021-12-07 PROCEDURE — 36415 COLL VENOUS BLD VENIPUNCTURE: CPT

## 2021-12-07 PROCEDURE — A6454 SELF-ADHER BAND W>=3" <5"/YD: HCPCS | Performed by: ORTHOPAEDIC SURGERY

## 2021-12-07 RX ORDER — HYDROMORPHONE HYDROCHLORIDE 1 MG/ML
0.4 INJECTION, SOLUTION INTRAMUSCULAR; INTRAVENOUS; SUBCUTANEOUS
Status: DISCONTINUED | OUTPATIENT
Start: 2021-12-07 | End: 2021-12-07 | Stop reason: HOSPADM

## 2021-12-07 RX ORDER — PHENYLEPHRINE HCL IN 0.9% NACL 0.5 MG/5ML
SYRINGE (ML) INTRAVENOUS PRN
Status: DISCONTINUED | OUTPATIENT
Start: 2021-12-07 | End: 2021-12-07 | Stop reason: SURG

## 2021-12-07 RX ORDER — MORPHINE SULFATE 4 MG/ML
4 INJECTION INTRAVENOUS EVERY 4 HOURS PRN
Status: COMPLETED | OUTPATIENT
Start: 2021-12-07 | End: 2021-12-08

## 2021-12-07 RX ORDER — HYDROMORPHONE HYDROCHLORIDE 1 MG/ML
0.2 INJECTION, SOLUTION INTRAMUSCULAR; INTRAVENOUS; SUBCUTANEOUS
Status: DISCONTINUED | OUTPATIENT
Start: 2021-12-07 | End: 2021-12-07 | Stop reason: HOSPADM

## 2021-12-07 RX ORDER — HALOPERIDOL 5 MG/ML
1 INJECTION INTRAMUSCULAR
Status: DISCONTINUED | OUTPATIENT
Start: 2021-12-07 | End: 2021-12-07 | Stop reason: HOSPADM

## 2021-12-07 RX ORDER — LIDOCAINE HYDROCHLORIDE 20 MG/ML
INJECTION, SOLUTION EPIDURAL; INFILTRATION; INTRACAUDAL; PERINEURAL PRN
Status: DISCONTINUED | OUTPATIENT
Start: 2021-12-07 | End: 2021-12-07 | Stop reason: SURG

## 2021-12-07 RX ORDER — LIDOCAINE 50 MG/G
1 PATCH TOPICAL EVERY 24 HOURS
Status: DISCONTINUED | OUTPATIENT
Start: 2021-12-07 | End: 2021-12-09 | Stop reason: HOSPADM

## 2021-12-07 RX ORDER — ONDANSETRON 2 MG/ML
INJECTION INTRAMUSCULAR; INTRAVENOUS PRN
Status: DISCONTINUED | OUTPATIENT
Start: 2021-12-07 | End: 2021-12-07 | Stop reason: SURG

## 2021-12-07 RX ORDER — SODIUM CHLORIDE, SODIUM LACTATE, POTASSIUM CHLORIDE, CALCIUM CHLORIDE 600; 310; 30; 20 MG/100ML; MG/100ML; MG/100ML; MG/100ML
INJECTION, SOLUTION INTRAVENOUS
Status: DISCONTINUED | OUTPATIENT
Start: 2021-12-07 | End: 2021-12-07 | Stop reason: SURG

## 2021-12-07 RX ORDER — CEFAZOLIN SODIUM 1 G/3ML
INJECTION, POWDER, FOR SOLUTION INTRAMUSCULAR; INTRAVENOUS PRN
Status: DISCONTINUED | OUTPATIENT
Start: 2021-12-07 | End: 2021-12-07 | Stop reason: SURG

## 2021-12-07 RX ORDER — SODIUM CHLORIDE, SODIUM LACTATE, POTASSIUM CHLORIDE, CALCIUM CHLORIDE 600; 310; 30; 20 MG/100ML; MG/100ML; MG/100ML; MG/100ML
INJECTION, SOLUTION INTRAVENOUS CONTINUOUS
Status: DISCONTINUED | OUTPATIENT
Start: 2021-12-07 | End: 2021-12-07 | Stop reason: HOSPADM

## 2021-12-07 RX ORDER — TRAMADOL HYDROCHLORIDE 50 MG/1
50 TABLET ORAL EVERY 4 HOURS PRN
Status: DISCONTINUED | OUTPATIENT
Start: 2021-12-07 | End: 2021-12-09 | Stop reason: HOSPADM

## 2021-12-07 RX ORDER — ONDANSETRON 2 MG/ML
4 INJECTION INTRAMUSCULAR; INTRAVENOUS
Status: DISCONTINUED | OUTPATIENT
Start: 2021-12-07 | End: 2021-12-07 | Stop reason: HOSPADM

## 2021-12-07 RX ORDER — HYDROMORPHONE HYDROCHLORIDE 1 MG/ML
0.1 INJECTION, SOLUTION INTRAMUSCULAR; INTRAVENOUS; SUBCUTANEOUS
Status: DISCONTINUED | OUTPATIENT
Start: 2021-12-07 | End: 2021-12-07 | Stop reason: HOSPADM

## 2021-12-07 RX ADMIN — Medication 100 MCG: at 12:45

## 2021-12-07 RX ADMIN — KETOROLAC TROMETHAMINE 30 MG: 30 INJECTION, SOLUTION INTRAMUSCULAR; INTRAVENOUS at 18:30

## 2021-12-07 RX ADMIN — TRAZODONE HYDROCHLORIDE 100 MG: 50 TABLET ORAL at 21:00

## 2021-12-07 RX ADMIN — LIDOCAINE 1 PATCH: 50 PATCH TOPICAL at 17:45

## 2021-12-07 RX ADMIN — DEXTROSE MONOHYDRATE 2 G: 50 INJECTION, SOLUTION INTRAVENOUS at 05:51

## 2021-12-07 RX ADMIN — CEFAZOLIN 2 G: 330 INJECTION, POWDER, FOR SOLUTION INTRAMUSCULAR; INTRAVENOUS at 12:26

## 2021-12-07 RX ADMIN — KETOROLAC TROMETHAMINE 30 MG: 30 INJECTION, SOLUTION INTRAMUSCULAR; INTRAVENOUS at 12:46

## 2021-12-07 RX ADMIN — HYDROMORPHONE HYDROCHLORIDE 0.2 MG: 1 INJECTION, SOLUTION INTRAMUSCULAR; INTRAVENOUS; SUBCUTANEOUS at 14:00

## 2021-12-07 RX ADMIN — HYDROMORPHONE HYDROCHLORIDE 0.4 MG: 1 INJECTION, SOLUTION INTRAMUSCULAR; INTRAVENOUS; SUBCUTANEOUS at 13:20

## 2021-12-07 RX ADMIN — LIDOCAINE HYDROCHLORIDE 60 MG: 20 INJECTION, SOLUTION EPIDURAL; INFILTRATION; INTRACAUDAL at 12:26

## 2021-12-07 RX ADMIN — HYDROMORPHONE HYDROCHLORIDE 0.4 MG: 1 INJECTION, SOLUTION INTRAMUSCULAR; INTRAVENOUS; SUBCUTANEOUS at 13:15

## 2021-12-07 RX ADMIN — SODIUM CHLORIDE, POTASSIUM CHLORIDE, SODIUM LACTATE AND CALCIUM CHLORIDE: 600; 310; 30; 20 INJECTION, SOLUTION INTRAVENOUS at 12:20

## 2021-12-07 RX ADMIN — GABAPENTIN 400 MG: 400 CAPSULE ORAL at 17:32

## 2021-12-07 RX ADMIN — GABAPENTIN 400 MG: 400 CAPSULE ORAL at 05:51

## 2021-12-07 RX ADMIN — FENTANYL CITRATE 50 MCG: 50 INJECTION, SOLUTION INTRAMUSCULAR; INTRAVENOUS at 13:01

## 2021-12-07 RX ADMIN — FENTANYL CITRATE 50 MCG: 50 INJECTION, SOLUTION INTRAMUSCULAR; INTRAVENOUS at 12:41

## 2021-12-07 RX ADMIN — FENTANYL CITRATE 50 MCG: 50 INJECTION, SOLUTION INTRAMUSCULAR; INTRAVENOUS at 12:26

## 2021-12-07 RX ADMIN — FENTANYL CITRATE 50 MCG: 50 INJECTION, SOLUTION INTRAMUSCULAR; INTRAVENOUS at 12:32

## 2021-12-07 RX ADMIN — MORPHINE SULFATE 4 MG: 4 INJECTION INTRAVENOUS at 19:45

## 2021-12-07 RX ADMIN — KETOROLAC TROMETHAMINE 30 MG: 30 INJECTION, SOLUTION INTRAMUSCULAR; INTRAVENOUS at 05:51

## 2021-12-07 RX ADMIN — HYDROMORPHONE HYDROCHLORIDE 0.4 MG: 1 INJECTION, SOLUTION INTRAMUSCULAR; INTRAVENOUS; SUBCUTANEOUS at 13:08

## 2021-12-07 RX ADMIN — ONDANSETRON 4 MG: 2 INJECTION INTRAMUSCULAR; INTRAVENOUS at 12:46

## 2021-12-07 RX ADMIN — PROPOFOL 160 MG: 10 INJECTION, EMULSION INTRAVENOUS at 12:26

## 2021-12-07 RX ADMIN — FENTANYL CITRATE 50 MCG: 50 INJECTION, SOLUTION INTRAMUSCULAR; INTRAVENOUS at 12:36

## 2021-12-07 RX ADMIN — DEXTROSE MONOHYDRATE 2 G: 50 INJECTION, SOLUTION INTRAVENOUS at 21:19

## 2021-12-07 ASSESSMENT — ENCOUNTER SYMPTOMS
DIARRHEA: 0
SHORTNESS OF BREATH: 0
BACK PAIN: 0
DOUBLE VISION: 0
NERVOUS/ANXIOUS: 1
POLYDIPSIA: 0
FEVER: 0
TINGLING: 1
MEMORY LOSS: 1
VOMITING: 0
DEPRESSION: 1
FALLS: 0
BLURRED VISION: 0
ABDOMINAL PAIN: 0
COUGH: 0
FLANK PAIN: 0
SEIZURES: 0
HEADACHES: 0
ORTHOPNEA: 0
FOCAL WEAKNESS: 0
SORE THROAT: 0
NAUSEA: 0
EYE PAIN: 0
WEAKNESS: 0
DIZZINESS: 0
TREMORS: 0
CONSTIPATION: 0
SENSORY CHANGE: 1
EYE DISCHARGE: 0
BLOOD IN STOOL: 0
SPEECH CHANGE: 0
SPUTUM PRODUCTION: 0
CHILLS: 0
PALPITATIONS: 0

## 2021-12-07 ASSESSMENT — PAIN DESCRIPTION - PAIN TYPE
TYPE: ACUTE PAIN

## 2021-12-07 ASSESSMENT — LIFESTYLE VARIABLES: SUBSTANCE_ABUSE: 1

## 2021-12-07 ASSESSMENT — PAIN SCALES - GENERAL: PAIN_LEVEL: 5

## 2021-12-07 NOTE — OP REPORT
DATE OF SERVICE:  12/07/2021     PREOPERATIVE DIAGNOSES:  1.  Left septic ankle.  2.  Left septic FHL tendon sheath.     POSTOPERATIVE DIAGNOSES:  1.  Left septic ankle.  2.  Left septic FHL tendon sheath.     PROCEDURES:  1.  Left ankle arthrotomy with irrigation and debridement.  2.  Left FHL tenolysis.     SURGEON:  Junior Hopper MD     ASSISTANT:  ÓSCAR Ye     ANESTHESIA:  General.     ESTIMATED BLOOD LOSS:  20 mL     TOURNIQUET TIME:  5 minutes at 250 mmHg.     FINDINGS:  Gross purulence within the tibiotalar joint and FHL tendon sheath.    No significant cartilage damage.     SPECIMENS:  Fluid for culture.     COMPLICATIONS:  None.     OUTCOME:  PACU in stable condition.     HISTORY OF PRESENT ILLNESS:  A 45-year-old gentleman initially had a washout   of his left ankle at [place] [facility] by Dr. Cervantes on 11/30/2021.  The cultures   grew group A strep and he has been placed on appropriate antibiotics.  He did   leave the hospital AMA from that visit and has had persistent and worsening   pain in the ankle.  He states he does feel better since his antibiotics for   more specific to his bug.  He is indicated for the above-stated procedure,   greeted in the preoperative holding area and identified by name and medical   record number.  Left lower extremity was marked.  Risks of procedure including   bleeding, infection, pain, continuation of symptoms, neurovascular damage,   need for more surgery were discussed.  He provided written consent.     DESCRIPTION OF PROCEDURE:  He was taken to the operating room and placed on   the table in supine position.  Preoperative antibiotics were administered.    General anesthesia was induced.  Nonsterile tourniquet was placed on his left   thigh.  Left lower extremity prepped and draped in the usual sterile fashion.    An operative pause was taken where all present were in agreement with patient   identification, laterality, and procedure to be performed.  The  limb was   elevated for 5 minutes, tourniquet raised to 250 mmHg.  We reopened his   previous anterolateral incision to remove the sutures.  We bluntly dissected   down to the capsule, which did not appear to be incised.  I made a capsular   incision and encountered gross dishwater colored purulent fluid and took a   culture swab and sent this for culture.  We irrigated throughout the joint.    We sharply excised synovitic and infected tissue.  I then made a 5 cm   curvilinear incision along the posterior tibial tendon centered around the   turn.  We evaluated the posterior tibial tendon and did not note any gross   purulence.  We dissected laterally towards the FHL and encountered some gross   purulence once we opened the FHL tendon sheath. Between the ankle and FHL, we   irrigated 3 liters of sterile normal saline after letting the tourniquet down.    We placed a Hemovac drain within the tibiotalar joint.  Each incision was   closed with interrupted 2-0 nylon suture in horizontal mattress fashion.    Adaptic gauze and compressive wrap were placed.  He was awakened and extubated   in stable condition.     POSTOPERATIVE COURSE:  He will remain under the care of the hospitalist   service.  We greatly appreciate their assistance in management.  Antibiotics   per Infectious Disease, weightbearing as tolerated in a Cam walker boot and I   would like him to be in the boot most of the time.  He may remove it 2-3 times   a day for gentle ankle range of motion activity.  Hemovac drain to be removed   on postoperative day #2.  He may discharge at any point from an orthopedic   standpoint as we have no further plans for surgical intervention.  I would   like him to follow up in my clinic in 2 weeks for a wound check, sooner if   issues arise.        ______________________________  MD ANGEL BINGHAM/ELVIRA/NITA    DD:  12/07/2021 13:08  DT:  12/07/2021 13:36    Job#:  [id number]

## 2021-12-07 NOTE — WOUND TEAM
Wound team consulted for patient's L. Ankle/foot.  Patient having surgery with Dr. Hopper today for an I&D today.  Please refer to Md dressing orders.  Wound team is signing off, please re-consult if needed.

## 2021-12-07 NOTE — ANESTHESIA TIME REPORT
Anesthesia Start and Stop Event Times     Date Time Event    12/7/2021 1205 Ready for Procedure     1220 Anesthesia Start     1303 Anesthesia Stop        Responsible Staff  12/07/21    Name Role Begin End    Jimy Kumari M.D. Anesth 1220 1303        Preop Diagnosis (Free Text):  Pre-op Diagnosis     Left ankle and medial tendon abscess        Preop Diagnosis (Codes):    Premium Reason  Non-Premium    Comments:

## 2021-12-07 NOTE — CARE PLAN
Problem: Pain - Standard  Goal: Alleviation of pain or a reduction in pain to the patient’s comfort goal  Outcome: Pt reported 8/10 pain of his L foot. Medicated per MAR. Pt pain in control and pt is currently is sleeping. Prn pain medications discussed with pt. Pt verbalized understanding     Problem: Knowledge Deficit - Standard  Goal: Patient and family/care givers will demonstrate understanding of plan of care, disease process/condition, diagnostic tests and medications  Outcome: POC, NPO at midnight for procedure, and medications discussed with pt. Pt verbalized understanding     The patient is Low risk.     Shift Goals  Clinical Goals: pain control  Patient Goals: rest, iv abx, NPO tonight for procedure  Family Goals: N/A

## 2021-12-07 NOTE — ANESTHESIA PROCEDURE NOTES
Airway    Date/Time: 12/7/2021 12:27 PM  Performed by: Jimy Kuamri M.D.  Authorized by: Jimy Kumari M.D.     Location:  OR  Urgency:  Elective  Indications for Airway Management:  Anesthesia      Spontaneous Ventilation: absent    Sedation Level:  Deep  Preoxygenated: Yes    Mask Difficulty Assessment:  1 - vent by mask  Final Airway Type:  Supraglottic airway  Final Supraglottic Airway:  Standard LMA    SGA Size:  4  Number of Attempts at Approach:  1

## 2021-12-07 NOTE — ANESTHESIA POSTPROCEDURE EVALUATION
Patient: Hermann Ruffin    Procedure Summary     Date: 12/07/21 Room / Location: [place] [id number] / SURGERY [facility]    Anesthesia Start: 1220 Anesthesia Stop: 1303    Procedure: IRRIGATION AND DEBRIDEMENT, WOUND (Left Ankle) Diagnosis: (Left ankle and medial tendon abscess)    Surgeons: Junior Hopper M.D. Responsible Provider: Jimy Kumari M.D.    Anesthesia Type: general ASA Status: 3          Final Anesthesia Type: general  Last vitals  BP   Blood Pressure: 109/66    Temp   36.6 °C (97.8 °F)    Pulse   80   Resp   13    SpO2   94 %      Anesthesia Post Evaluation    Patient location during evaluation: PACU  Patient participation: complete - patient participated  Level of consciousness: awake and alert  Pain score: 5    Airway patency: patent  Anesthetic complications: no  Cardiovascular status: hemodynamically stable  Respiratory status: acceptable  Hydration status: euvolemic    PONV: none          No complications documented.     Nurse Pain Score: 5 (NPRS)

## 2021-12-07 NOTE — ANESTHESIA PREPROCEDURE EVALUATION
Case: [id number] Date/Time: 12/07/21 1923    Procedure: IRRIGATION AND DEBRIDEMENT, WOUND (Left Ankle)    Location: [facility] OR 12 / SURGERY [facility]    Surgeons: Junior Hopper M.D.          Relevant Problems   Other   (positive) Cellulitis   (positive) HIV (human immunodeficiency virus infection) (HCC)   (positive) Neuropathy   (positive) Polysubstance abuse (HCC)   anemia    Physical Exam    Airway   Mallampati: II  TM distance: >3 FB  Neck ROM: full       Cardiovascular - normal exam  Rhythm: regular  Rate: normal  (-) murmur     Dental     Comments: Broken teeth, denies loose         Pulmonary - normal exam  Breath sounds clear to auscultation     Abdominal    Neurological - normal exam               Anesthesia Plan    ASA 3   ASA physical status 3 criteria: alcohol and/or substance dependence or abuse    Plan - general       Airway plan will be LMA          Induction: intravenous    Postoperative Plan: Postoperative administration of opioids is intended.    Pertinent diagnostic labs and testing reviewed    Informed Consent:    Anesthetic plan and risks discussed with patient.

## 2021-12-07 NOTE — PROGRESS NOTES
Ortho Prog Note    NPO for ankle washout, agree with plan outlined in Dr. Marks note      #1 Left ankle and medial tendon abscess    Plan:    Left ankle I&D  WBAT postop in boot  Ice and elevate  Stay ahead of pain    Junior Hopper MD
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
## 2021-12-07 NOTE — OR NURSING
Awake, alert and tolerating PO fluids.  Pain controlled.  Vitals stable.  On monitors with alarms audible.    Declined phone call to family.  Eating boxed lunch.

## 2021-12-07 NOTE — CARE PLAN
Problem: Pain - Standard  Goal: Alleviation of pain or a reduction in pain to the patient’s comfort goal  Outcome: Progressing     Problem: Knowledge Deficit - Standard  Goal: Patient and family/care givers will demonstrate understanding of plan of care, disease process/condition, diagnostic tests and medications  Outcome: Progressing   The patient is Stable - Low risk of patient condition declining or worsening    Shift Goals  Clinical Goals: Pain control; Wound management  Patient Goals: Rest; Pain control  Family Goals: N/A    Progress made toward(s) clinical / shift goals:  Patient to procedure with successful clean out of wound.    Patient is not progressing towards the following goals:

## 2021-12-07 NOTE — OR SURGEON
Immediate Post OP Note    PreOp Diagnosis: Left ankle septic arthritis, infected tenosynovitis FHL      PostOp Diagnosis: Same      Procedure(s):  IRRIGATION AND DEBRIDEMENT, WOUND - Wound Class: Dirty or Infected    Surgeon(s):  Junior Hopper M.D.    Anesthesiologist/Type of Anesthesia:  Anesthesiologist: Jimy Kumari M.D./General    Surgical Staff:  Assistant: PAT Rendon  Circulator: Chelsea Gao R.N.  Relief Circulator: Caterina Jules R.N.  Scrub Person: Bolivar Coleman    Specimens removed if any:  ID Type Source Tests Collected by Time Destination   1 : Left ankle fluid Body Fluid Ankle AFB CULTURE, FUNGAL CULTURE, CULTURE WOUND W/ GRAM STAIN, AEROBIC/ANAEROBIC CULTURE (SURGERY) Junior Hopper M.D. 12/7/2021 12:35 PM        Estimated Blood Loss: 20cc    TT: 5 min @ 250mmHg    Findings: Gross purulence within ankle joint and FHL sheath    Complications: None        12/7/2021 12:54 PM Junior Hopper M.D.
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
## 2021-12-07 NOTE — PROGRESS NOTES
"Patient is upset about NPO status and scheduled time for procedure.  RN educated patient as to necessity of NPO status and that if the schedule changes, he might be able to get in sooner.  Patient states \"I'm just going to eat\".  RN educated patient that if he does eat, he will further be delaying his own care.  Patient also re-educated as to accomodation of service dog that is at bedside.    "

## 2021-12-07 NOTE — PROGRESS NOTES
Order for pt to be fitted for a diabetic boot to [facility] has been submitted to ortho [employer]. If any questions you can contact [employer] at ext # [phone]

## 2021-12-08 LAB
ANION GAP SERPL CALC-SCNC: 7 MMOL/L (ref 7–16)
BACTERIA BLD CULT: NORMAL
BACTERIA BLD CULT: NORMAL
BUN SERPL-MCNC: 25 MG/DL (ref 8–22)
CALCIUM SERPL-MCNC: 8.1 MG/DL (ref 8.5–10.5)
CHLORIDE SERPL-SCNC: 103 MMOL/L (ref 96–112)
CO2 SERPL-SCNC: 24 MMOL/L (ref 20–33)
CREAT SERPL-MCNC: 0.93 MG/DL (ref 0.5–1.4)
FUNGUS SPEC FUNGUS STN: NORMAL
GLUCOSE BLD-MCNC: 120 MG/DL (ref 65–99)
GLUCOSE SERPL-MCNC: 99 MG/DL (ref 65–99)
HIV 1 & 2 AB SER-IMP: ABNORMAL
HIV 1 & 2 AB SERPL IA.RAPID: ABNORMAL
HIV 2 AB SERPL QL IA: NEGATIVE
HIV1 AB SERPL QL IA: POSITIVE
POTASSIUM SERPL-SCNC: 5.2 MMOL/L (ref 3.6–5.5)
SIGNIFICANT IND 70042: NORMAL
SITE SITE: NORMAL
SODIUM SERPL-SCNC: 134 MMOL/L (ref 135–145)
SOURCE SOURCE: NORMAL

## 2021-12-08 PROCEDURE — 700105 HCHG RX REV CODE 258: Performed by: INTERNAL MEDICINE

## 2021-12-08 PROCEDURE — 700101 HCHG RX REV CODE 250: Performed by: HOSPITALIST

## 2021-12-08 PROCEDURE — 770006 HCHG ROOM/CARE - MED/SURG/GYN SEMI*

## 2021-12-08 PROCEDURE — 700102 HCHG RX REV CODE 250 W/ 637 OVERRIDE(OP): Performed by: INTERNAL MEDICINE

## 2021-12-08 PROCEDURE — 700111 HCHG RX REV CODE 636 W/ 250 OVERRIDE (IP): Performed by: STUDENT IN AN ORGANIZED HEALTH CARE EDUCATION/TRAINING PROGRAM

## 2021-12-08 PROCEDURE — 99232 SBSQ HOSP IP/OBS MODERATE 35: CPT | Performed by: HOSPITALIST

## 2021-12-08 PROCEDURE — 700111 HCHG RX REV CODE 636 W/ 250 OVERRIDE (IP): Performed by: INTERNAL MEDICINE

## 2021-12-08 PROCEDURE — A9270 NON-COVERED ITEM OR SERVICE: HCPCS | Performed by: INTERNAL MEDICINE

## 2021-12-08 PROCEDURE — A9270 NON-COVERED ITEM OR SERVICE: HCPCS | Performed by: NURSE PRACTITIONER

## 2021-12-08 PROCEDURE — 700111 HCHG RX REV CODE 636 W/ 250 OVERRIDE (IP): Performed by: HOSPITALIST

## 2021-12-08 PROCEDURE — A9270 NON-COVERED ITEM OR SERVICE: HCPCS | Performed by: HOSPITALIST

## 2021-12-08 PROCEDURE — 82962 GLUCOSE BLOOD TEST: CPT

## 2021-12-08 PROCEDURE — 36415 COLL VENOUS BLD VENIPUNCTURE: CPT

## 2021-12-08 PROCEDURE — 80048 BASIC METABOLIC PNL TOTAL CA: CPT

## 2021-12-08 PROCEDURE — 700102 HCHG RX REV CODE 250 W/ 637 OVERRIDE(OP): Performed by: HOSPITALIST

## 2021-12-08 PROCEDURE — 700102 HCHG RX REV CODE 250 W/ 637 OVERRIDE(OP): Performed by: NURSE PRACTITIONER

## 2021-12-08 PROCEDURE — 99024 POSTOP FOLLOW-UP VISIT: CPT | Performed by: STUDENT IN AN ORGANIZED HEALTH CARE EDUCATION/TRAINING PROGRAM

## 2021-12-08 PROCEDURE — 700101 HCHG RX REV CODE 250: Performed by: INTERNAL MEDICINE

## 2021-12-08 RX ADMIN — TRAZODONE HYDROCHLORIDE 100 MG: 50 TABLET ORAL at 20:41

## 2021-12-08 RX ADMIN — KETOROLAC TROMETHAMINE 30 MG: 30 INJECTION, SOLUTION INTRAMUSCULAR; INTRAVENOUS at 02:50

## 2021-12-08 RX ADMIN — GABAPENTIN 400 MG: 400 CAPSULE ORAL at 17:50

## 2021-12-08 RX ADMIN — DEXTROSE MONOHYDRATE 2 G: 50 INJECTION, SOLUTION INTRAVENOUS at 13:06

## 2021-12-08 RX ADMIN — DEXTROSE MONOHYDRATE 2 G: 50 INJECTION, SOLUTION INTRAVENOUS at 05:36

## 2021-12-08 RX ADMIN — TRAMADOL HYDROCHLORIDE 50 MG: 50 TABLET, FILM COATED ORAL at 15:11

## 2021-12-08 RX ADMIN — GABAPENTIN 400 MG: 400 CAPSULE ORAL at 04:03

## 2021-12-08 RX ADMIN — GABAPENTIN 400 MG: 400 CAPSULE ORAL at 11:16

## 2021-12-08 RX ADMIN — TRAMADOL HYDROCHLORIDE 50 MG: 50 TABLET, FILM COATED ORAL at 03:07

## 2021-12-08 RX ADMIN — LIDOCAINE 1 PATCH: 50 PATCH TOPICAL at 20:41

## 2021-12-08 RX ADMIN — DEXTROSE MONOHYDRATE 2 G: 50 INJECTION, SOLUTION INTRAVENOUS at 21:04

## 2021-12-08 RX ADMIN — HEPARIN SODIUM 5000 UNITS: 5000 INJECTION, SOLUTION INTRAVENOUS; SUBCUTANEOUS at 13:06

## 2021-12-08 RX ADMIN — MORPHINE SULFATE 4 MG: 4 INJECTION INTRAVENOUS at 04:03

## 2021-12-08 RX ADMIN — TRAMADOL HYDROCHLORIDE 50 MG: 50 TABLET, FILM COATED ORAL at 11:16

## 2021-12-08 ASSESSMENT — ENCOUNTER SYMPTOMS
FOCAL WEAKNESS: 0
BLURRED VISION: 0
DIZZINESS: 0
ORTHOPNEA: 0
TREMORS: 0
VOMITING: 0
SENSORY CHANGE: 1
POLYDIPSIA: 0
EYE DISCHARGE: 0
SEIZURES: 0
FALLS: 0
BLOOD IN STOOL: 0
DEPRESSION: 1
SORE THROAT: 0
SPUTUM PRODUCTION: 0
NAUSEA: 0
SPEECH CHANGE: 0
FEVER: 0
BACK PAIN: 0
DOUBLE VISION: 0
DIARRHEA: 0
HEADACHES: 0
EYE PAIN: 0
COUGH: 0
ABDOMINAL PAIN: 0
FLANK PAIN: 0
WEAKNESS: 0
CONSTIPATION: 0
CHILLS: 0
PALPITATIONS: 0
MEMORY LOSS: 1
SHORTNESS OF BREATH: 0
TINGLING: 1
NERVOUS/ANXIOUS: 1

## 2021-12-08 ASSESSMENT — PAIN DESCRIPTION - PAIN TYPE
TYPE: ACUTE PAIN
TYPE: ACUTE PAIN

## 2021-12-08 ASSESSMENT — LIFESTYLE VARIABLES: SUBSTANCE_ABUSE: 1

## 2021-12-08 NOTE — PROGRESS NOTES
Alert and oriented x4 with service dog at bedside. Pt complains of pain at the start of the shift, on his wound with scale of 8/10. Paged hospitalist. Spoke with Dr. Gongora and given PRN orders. Pt aware. Offered fluids and snacks. Safety precautions in placed. Bed in lowest position. Upper side rails up. Treaded socks on. Reinforced the use of call light when needing assistance.

## 2021-12-08 NOTE — DISCHARGE PLANNING
Received Choice form at [id number]  Agency/Facility Name: [facility]  Referral sent per Choice form @ 0104

## 2021-12-08 NOTE — PROGRESS NOTES
"Pt complains of pain in left ankle. Given PRN toradol for pain. Pt wants to have morphine round the clock. Pt was educated that morphine is PRN and it is every 4 hours. Then pt went to sleep. At 0252 pt is asking for morphine and said the pain is increasing I need morphine. Pt was told that toradol was just given and needs to wait to effect. Pt said \"I want my fucking doctor. I want to increase my morphine.\"   "
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
## 2021-12-08 NOTE — PROGRESS NOTES
POST-OP NOTE FOR LIMB PRESERVATION SERVICE    SURGERY DATE: 12/8/2021    PROCEDURE: Procedure(s):  IRRIGATION AND DEBRIDEMENT, WOUND - Wound Class: Dirty or Infected    WEIGHT BEARING STATUS: Weight bearing as tolerated in CAM boot    PT CONSULT: Yes    ANTIBIOTICS: Per ID recommendation    PLAN TO RETURN TO O.R.: No    WOUND CARE PLAN: Hemovac drain to be removed tomorrow 12/09    FOLLOW-UP: Follow up with Junior Hopper  in 2 weeks    DURABLE MEDICAL EQUIPMENT: Crutches    CC/Patient Identifier  45 year old male with PMH of HIV, polysubstance abuse, homelessness who presented with persistent left septic ankle      Interval History  Sp washout yesterday. Drain in place with mild drainage. Pain much better than this morning. Denies fevers or chills.     Vitals:    12/08/21 0800   BP: 121/70   Pulse: 90   Resp: 16   Temp: 36.7 °C (98 °F)   SpO2: 96%         PHYSICAL EXAM:  General: Well appearing, No acute distress  Neuro: Alert and oriented x3, answering questions appropriately   Psychiatric: pleasant demeanor. Appropriate affect and behavior  Eyes: Pupils equal and round  Pulmonary: regular rate, normal work of breathing on Room air  Cardiovascular: Palpable pulses, brisk capillary refill    MSK EXAM:   Left ankle dressings clean and dry. Hemovac with serosang output. No neuro deficits. Toes WWP      ASSESSMENT/PLAN    1.  Left septic ankle.  2.  Left septic FHL tendon sheath.    WBAT in CAM boot  Ok for gentle ankle ROM when drain out  DC hemovac drain on POD 2  Follow up in clinic in 2 weeks      Eulogio Liang M.D.

## 2021-12-08 NOTE — FACE TO FACE
Face to Face Note  -  Durable Medical Equipment    Shirlene Chan M.D. - NPI: [id number]  I certify that this patient is under my care and that they had a durable medical equipment(DME)face to face encounter by myself that meets the physician DME face-to-face encounter requirements with this patient on:    Date of encounter:   Patient:                    MRN:                       YOB: 2020  Hermann Ruffin  [id number]  1975     The encounter with the patient was in whole, or in part, for the following medical condition, which is the primary reason for durable medical equipment:  Wound Care    I certify that, based on my findings, the following durable medical equipment is medically necessary:  Wheel Chair.      My Clinical findings support the need for the above equipment due to:  Abnormal Gait    Supporting Symptoms:   Unable to tolerated pressure on left foot

## 2021-12-08 NOTE — DISCHARGE PLANNING
Anticipated Discharge Disposition: home    Action: Reviewed in IDT rounds. Pt not med cleared. Pt is s/p I&D of ankle 12/7/21 and has a hemovac than can be pulled POD #2.     Pt is homeless and has no insurance. He has a service dog in bed with him.   Per Dr Chan-pt wants a wheelchair. This cm requested MD place wheelchair order justifying why pt needs a wheelchair instead of FWW or crutches. Ortho tech saw pt yesterday for a diabetic boot.     Pt has no insurance and wants a wheelchair. Teams texted MEERA Andrews, to see which DME company would take our approved services and which DME company would be the cheapest since [facility] will have to pay for the wheelchair. Also texted Ashley ESTES to see which company takes approved services and is cheapest?? Ashley suggested [employer] or [employer]. Choice form faxed to Juliana ESTES to call and get some pricing.     Per MD-pt state his father lives in [place] and is wealthy and may be convinced to pay for a motel for pt for a few weeks and that he has 2 friend in [place]?? Pt needs to make the requests to his family and friend in [place] himself.     Barriers to Discharge: no insurance, homeless, no insurance for wheelchair.     Plan: care coordination to follow for dc planning needs.

## 2021-12-08 NOTE — CARE PLAN
The patient is Stable - Low risk of patient condition declining or worsening    Shift Goals  Clinical Goals: Pain management  Patient Goals: pt will comfort and able to use non pharmacologic pain relief  Family Goals: N/A    Progress made toward(s) clinical / shift goals:      Patient is not progressing towards the following goals:
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
## 2021-12-09 ENCOUNTER — PHARMACY VISIT (OUTPATIENT)
Dept: PHARMACY | Facility: MEDICAL CENTER | Age: 46
End: 2021-12-09
Payer: COMMERCIAL

## 2021-12-09 VITALS
TEMPERATURE: 99.5 F | OXYGEN SATURATION: 97 % | RESPIRATION RATE: 18 BRPM | SYSTOLIC BLOOD PRESSURE: 106 MMHG | HEART RATE: 88 BPM | HEIGHT: 70 IN | DIASTOLIC BLOOD PRESSURE: 58 MMHG | BODY MASS INDEX: 22.9 KG/M2 | WEIGHT: 160 LBS

## 2021-12-09 PROBLEM — E87.20 LACTIC ACID ACIDOSIS: Status: RESOLVED | Noted: 2021-12-03 | Resolved: 2021-12-09

## 2021-12-09 PROBLEM — A41.9 SEPSIS (HCC): Status: RESOLVED | Noted: 2021-12-03 | Resolved: 2021-12-09

## 2021-12-09 PROBLEM — M54.50 ACUTE LEFT-SIDED LOW BACK PAIN WITHOUT SCIATICA: Status: RESOLVED | Noted: 2021-12-04 | Resolved: 2021-12-09

## 2021-12-09 LAB
HIV-1 NAAT (COPIES/ML) L204479A: ABNORMAL CPY/ML
HIV-1 NAAT (LOG COPIES/ML) L295410: 5.51 LOG CPY/ML
HIV1 RNA SERPL QL NAA+PROBE: DETECTED

## 2021-12-09 PROCEDURE — 700102 HCHG RX REV CODE 250 W/ 637 OVERRIDE(OP): Performed by: NURSE PRACTITIONER

## 2021-12-09 PROCEDURE — 99239 HOSP IP/OBS DSCHRG MGMT >30: CPT | Performed by: HOSPITALIST

## 2021-12-09 PROCEDURE — A9270 NON-COVERED ITEM OR SERVICE: HCPCS | Performed by: HOSPITALIST

## 2021-12-09 PROCEDURE — 700102 HCHG RX REV CODE 250 W/ 637 OVERRIDE(OP): Performed by: HOSPITALIST

## 2021-12-09 PROCEDURE — RXMED WILLOW AMBULATORY MEDICATION CHARGE: Performed by: HOSPITALIST

## 2021-12-09 PROCEDURE — A9270 NON-COVERED ITEM OR SERVICE: HCPCS | Performed by: NURSE PRACTITIONER

## 2021-12-09 PROCEDURE — 700111 HCHG RX REV CODE 636 W/ 250 OVERRIDE (IP): Performed by: INTERNAL MEDICINE

## 2021-12-09 RX ORDER — ACETAMINOPHEN 325 MG/1
650 TABLET ORAL EVERY 4 HOURS PRN
Qty: 30 TABLET | Refills: 0 | Status: SHIPPED | OUTPATIENT
Start: 2021-12-09

## 2021-12-09 RX ORDER — CEPHALEXIN 500 MG/1
1000 CAPSULE ORAL 3 TIMES DAILY
Qty: 60 CAPSULE | Refills: 0 | Status: SHIPPED | OUTPATIENT
Start: 2021-12-09 | End: 2021-12-19

## 2021-12-09 RX ORDER — LIDOCAINE 50 MG/G
1 PATCH TOPICAL EVERY 24 HOURS
Qty: 10 PATCH | Refills: 0 | Status: SHIPPED | OUTPATIENT
Start: 2021-12-09

## 2021-12-09 RX ORDER — TRAMADOL HYDROCHLORIDE 50 MG/1
50 TABLET ORAL EVERY 4 HOURS PRN
Qty: 12 TABLET | Refills: 0 | Status: SHIPPED | OUTPATIENT
Start: 2021-12-09 | End: 2021-12-12

## 2021-12-09 RX ORDER — GABAPENTIN 400 MG/1
400 CAPSULE ORAL 3 TIMES DAILY
Qty: 90 CAPSULE | Refills: 0 | Status: SHIPPED | OUTPATIENT
Start: 2021-12-09

## 2021-12-09 RX ADMIN — TRAMADOL HYDROCHLORIDE 50 MG: 50 TABLET, FILM COATED ORAL at 09:09

## 2021-12-09 RX ADMIN — GABAPENTIN 400 MG: 400 CAPSULE ORAL at 05:13

## 2021-12-09 RX ADMIN — TRAMADOL HYDROCHLORIDE 50 MG: 50 TABLET, FILM COATED ORAL at 12:34

## 2021-12-09 RX ADMIN — DEXTROSE MONOHYDRATE 2 G: 50 INJECTION, SOLUTION INTRAVENOUS at 05:13

## 2021-12-09 RX ADMIN — TRAMADOL HYDROCHLORIDE 50 MG: 50 TABLET, FILM COATED ORAL at 00:13

## 2021-12-09 RX ADMIN — GABAPENTIN 400 MG: 400 CAPSULE ORAL at 12:35

## 2021-12-09 ASSESSMENT — COGNITIVE AND FUNCTIONAL STATUS - GENERAL
MOBILITY SCORE: 24
DAILY ACTIVITIY SCORE: 24
SUGGESTED CMS G CODE MODIFIER MOBILITY: CH
SUGGESTED CMS G CODE MODIFIER DAILY ACTIVITY: CH

## 2021-12-09 ASSESSMENT — PATIENT HEALTH QUESTIONNAIRE - PHQ9
1. LITTLE INTEREST OR PLEASURE IN DOING THINGS: NOT AT ALL
2. FEELING DOWN, DEPRESSED, IRRITABLE, OR HOPELESS: NOT AT ALL
SUM OF ALL RESPONSES TO PHQ9 QUESTIONS 1 AND 2: 0

## 2021-12-09 ASSESSMENT — PAIN DESCRIPTION - PAIN TYPE
TYPE: ACUTE PAIN
TYPE: ACUTE PAIN;SURGICAL PAIN
TYPE: ACUTE PAIN

## 2021-12-09 NOTE — CARE PLAN
The patient is Stable - Low risk of patient condition declining or worsening    Shift Goals  Clinical Goals: Pain management  Patient Goals: Pain management, wheelchair for discharge  Family Goals: N/A    Progress made toward(s) clinical / shift goals:   PRN pain medication administered per MAR, will continue to reassess and monitor. Wheelchair delivered, clothing given to patient. Pt to  medications from pharmacy, will call social work when ready for Taxi  to [place]. Pt's father will pay for medications and hotel.       Problem: Pain - Standard  Goal: Alleviation of pain or a reduction in pain to the patient’s comfort goal  Outcome: Progressing     Problem: Knowledge Deficit - Standard  Goal: Patient and family/care givers will demonstrate understanding of plan of care, disease process/condition, diagnostic tests and medications  Outcome: Progressing

## 2021-12-09 NOTE — DISCHARGE PLANNING
ARMANDO GAMEZ provided this Pt with cab voucher to the [facility] to assist with discharge.  (#[phone])

## 2021-12-09 NOTE — DISCHARGE PLANNING
Anticipated Discharge Disposition: Return to shelter with Wheelchair     Action: LSW placed call to [facility], received approval for wheelchair due to wound needs and pt's no mobilizing at bedside. LSW contacted [employer], gave card number for payment, cost is $350. (spoke with Katharine @ [employer])    Barriers to Discharge: Delivery of wheelchair     Plan: LSW meet with pt after rounds

## 2021-12-09 NOTE — PROGRESS NOTES
Discharge paperwork discussed with patient. All questions answered. Drain removed. PIV d/c-ed. Pt off unit via wheelchair with dog. Declined nursing escort
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
## 2021-12-09 NOTE — CARE PLAN
Problem: Pain - Standard  Goal: Alleviation of pain or a reduction in pain to the patient’s comfort goal  Outcome: Not Progressing     Problem: Knowledge Deficit - Standard  Goal: Patient and family/care givers will demonstrate understanding of plan of care, disease process/condition, diagnostic tests and medications  Outcome: Not Progressing   The patient is Stable - Low risk of patient condition declining or worsening    Shift Goals  Clinical Goals: Compliance with boot; Pain control  Patient Goals: Pain control  Family Goals: N/A    Progress made toward(s) clinical / shift goals:      Patient is not progressing towards the following goals:Patient continues to report uncontrolled pain; patient is unreceptive to education regarding plan of care.      Problem: Pain - Standard  Goal: Alleviation of pain or a reduction in pain to the patient’s comfort goal  Outcome: Not Progressing     Problem: Knowledge Deficit - Standard  Goal: Patient and family/care givers will demonstrate understanding of plan of care, disease process/condition, diagnostic tests and medications  Outcome: Not Progressing

## 2021-12-09 NOTE — DISCHARGE INSTRUCTIONS
Abscess  Care After  An abscess (also called a boil or furuncle) is an infected area that contains a collection of pus. Signs and symptoms of an abscess include pain, tenderness, redness, or hardness, or you may feel a moveable soft area under your skin. An abscess can occur anywhere in the body. The infection may spread to surrounding tissues causing cellulitis. A cut (incision) by the surgeon was made over your abscess and the pus was drained out. Gauze may have been packed into the space to provide a drain that will allow the cavity to heal from the inside outwards. The boil may be painful for 5 to 7 days. Most people with a boil do not have high fevers. Your abscess, if seen early, may not have localized, and may not have been lanced. If not, another appointment may be required for this if it does not get better on its own or with medications.  HOME CARE INSTRUCTIONS   · Only take over-the-counter or prescription medicines for pain, discomfort, or fever as directed by your caregiver.  · When you bathe, soak and then remove gauze or iodoform packs at least daily or as directed by your caregiver. You may then wash the wound gently with mild soapy water. Repack with gauze or do as your caregiver directs.  SEEK IMMEDIATE MEDICAL CARE IF:   · You develop increased pain, swelling, redness, drainage, or bleeding in the wound site.  · You develop signs of generalized infection including muscle aches, chills, fever, or a general ill feeling.  · An oral temperature above 102° F (38.9° C) develops, not controlled by medication.  See your caregiver for a recheck if you develop any of the symptoms described above. If medications (antibiotics) were prescribed, take them as directed.  Document Released: 07/06/2006 Document Revised: 03/11/2013 Document Reviewed: 03/02/2009  [employer] Patient Information ©2014 [employer].    Discharge Instructions    Discharged to other by medical transportation with escort. Discharged via  wheelchair, hospital escort: Yes.  Special equipment needed: Wheelchair    Be sure to schedule a follow-up appointment with your primary care doctor or any specialists as instructed.     Discharge Plan:   Diet Plan: Discussed  Activity Level: Discussed  Confirmed Follow up Appointment: Appointment Scheduled  Confirmed Symptoms Management: Discussed  Medication Reconciliation Updated: Yes  Influenza Vaccine Indication: Patient Refuses    I understand that a diet low in cholesterol, fat, and sodium is recommended for good health. Unless I have been given specific instructions below for another diet, I accept this instruction as my diet prescription.   Other diet: Regular diet as tolerated    Special Instructions: None    · Is patient discharged on Warfarin / Coumadin?   No     Depression / Suicide Risk    As you are discharged from this [facility] facility, it is important to learn how to keep safe from harming yourself.    Recognize the warning signs:  · Abrupt changes in personality, positive or negative- including increase in energy   · Giving away possessions  · Change in eating patterns- significant weight changes-  positive or negative  · Change in sleeping patterns- unable to sleep or sleeping all the time   · Unwillingness or inability to communicate  · Depression  · Unusual sadness, discouragement and loneliness  · Talk of wanting to die  · Neglect of personal appearance   · Rebelliousness- reckless behavior  · Withdrawal from people/activities they love  · Confusion- inability to concentrate     If you or a loved one observes any of these behaviors or has concerns about self-harm, here's what you can do:  · Talk about it- your feelings and reasons for harming yourself  · Remove any means that you might use to hurt yourself (examples: pills, rope, extension cords, firearm)  · Get professional help from the community (Mental Health, Substance Abuse, psychological counseling)  · Do not be alone:Call your  Safe Contact- someone whom you trust who will be there for you.  · Call your local CRISIS HOTLINE 313-7658 or 651-778-4806  · Call your local Children's Mobile Crisis Response Team [place] (322) 728-8134 or www.[employer]  · Call the toll free National Suicide Prevention Hotlines   · National Suicide Prevention Lifeline 292-571-PAGK (3010)  · National [employer] Network 800-SUICIDE (945-0902)

## 2021-12-09 NOTE — PROGRESS NOTES
" [facility]   POST SURGICAL PROGRESS NOTE      HPI:      45M with PMHx of HIV, irritable bowel syndrome, homelessness, polysubstance abuse, s/p osteotomy right 5th MTH due to osteomyelitis in past. Admitted for left ankle pain and was found to have septic left ankle and septic left FHL tendon sheath. Patient was treated with Abx and was taken to OR by Dr. Hopper for left ankle arthrotomy with I&D and left FHL tenolysis on 12/7/2021.    SURGERY DATE: 12/7/2021 - Dr. Hopper  PROCEDURE:   1.  Left ankle arthrotomy with irrigation and debridement.  2.  Left FHL tenolysis.    INTERVAL HISTORY:  12/9/2021: POD #2.  Patient denies fevers, chills, nausea, vomiting.  Has pain in left ankle with ambulation, however he reports has improved since surgery. Case management coordinated for wheelchair.      PERTINENT LPS RESULTS:   Pathology: None    SURGICAL SITE EXAM:      /58   Pulse 88   Temp 37.5 °C (99.5 °F) (Temporal)   Resp 18   Ht 1.778 m (5' 10\")   Wt 72.6 kg (160 lb)   SpO2 97%   BMI 22.96 kg/m²     Pedal Pulses: palpable bilaterally  Sensation: Sensation intact  Surgical foot warm    Left Ankle Medial and Lateral Incisions  Incision well approximated, sutures intact.   Left lateral incisional Erythema  Mild Edema left ankle  No Drainage                Wound care completed by LPS: Single layer adaptic, dry gauze, ACE wrap      DIABETES MANAGEMENT:    Blood glucose: Results from last 7 days   Lab Units 12/08/21  0538 12/07/21  0546 12/03/21  1322 12/03/21  0538   ACCU CHECK GLUCOSE 788 mg/dL 120* 101* 182* 137*     A1c:   Lab Results   Component Value Date/Time    HBA1C 6.0 (H) 12/04/2021 04:30 PM            INFECTION MANAGEMENT:    Results from last 7 days   Lab Units 12/07/21  0536 12/06/21  0629 12/05/21  0749 12/04/21  0350 12/03/21  0143   WBC 1501 K/uL 7.5 6.6 7.1 5.6 10.2   PLATELET COUNT 1518 K/uL 390 384 361 351 336     Wound culture results:   Results     Procedure Component Value " Units Date/Time    CULTURE WOUND W/ GRAM STAIN [[id number]] Collected: 12/07/21 1235    Order Status: Completed Specimen: Wound Updated: 12/09/21 1104     Significant Indicator NEG     Source WND     Site left ankle     Culture Result No growth at 48 hours.     Gram Stain Result Few WBCs.  No organisms seen.      Narrative:      afb unacceptable for eswabs  Surgery - swabs received    Anaerobic Culture [[id number]] Collected: 12/07/21 1235    Order Status: Completed Specimen: Wound Updated: 12/09/21 1104     Significant Indicator NEG     Source WND     Site left ankle     Culture Result Culture in progress.    Narrative:      afb unacceptable for eswabs  Surgery - swabs received    Fungal Culture [[id number]] Collected: 12/07/21 1235    Order Status: Completed Specimen: Wound Updated: 12/09/21 1104     Significant Indicator NEG     Source WND     Site left ankle     Culture Result -     Fungal Smear Results No fungal elements seen.    Narrative:      afb unacceptable for eswabs  Surgery - swabs received    GRAM STAIN [[id number]] Collected: 12/07/21 1235    Order Status: Completed Specimen: Wound Updated: 12/08/21 1326     Significant Indicator .     Source WND     Site left ankle     Gram Stain Result Few WBCs.  No organisms seen.      Narrative:      afb unacceptable for eswabs  Surgery - swabs received    Fungal Smear [[id number]] Collected: 12/07/21 1235    Order Status: Completed Specimen: Wound Updated: 12/08/21 1326     Significant Indicator NEG     Source WND     Site left ankle     Fungal Smear Results No fungal elements seen.    Narrative:      afb unacceptable for eswabs  Surgery - swabs received    BLOOD CULTURE [[id number]] Collected: 12/03/21 0215    Order Status: Completed Specimen: Blood from Peripheral Updated: 12/08/21 0300     Significant Indicator NEG     Source BLD     Site PERIPHERAL     Culture Result No growth after 5 days of incubation.    Narrative:      Collected By:  Per Hospital Policy: Only  "change Specimen Src: to \"Line\" if  specified by physician order.  Right Forearm/Arm    BLOOD CULTURE [[id number]] Collected: 12/03/21 0229    Order Status: Completed Specimen: Blood from Peripheral Updated: 12/08/21 0300     Significant Indicator NEG     Source BLD     Site PERIPHERAL     Culture Result No growth after 5 days of incubation.    Narrative:      Collected By:  Per Hospital Policy: Only change Specimen Src: to \"Line\" if  specified by physician order.  Left Hand    URINE CULTURE(NEW) [[id number]] Collected: 12/03/21 0346    Order Status: Completed Specimen: Urine Updated: 12/05/21 0816     Significant Indicator NEG     Source UR     Site -     Culture Result No growth at 48 hours.    Narrative:      Collected By:  Indication for culture:->Evaluation for sepsis without a  clear source of infection  Collected By:    MRSA By PCR (Amp) [[id number]] Collected: 12/04/21 1035    Order Status: Completed Specimen: Respirate from Nares Updated: 12/04/21 1910     Significant Indicator NEG     Source RESP     Site NARES     MRSA PCR Negative for MRSA by PCR.    Narrative:      Collected By:  Collected By:    SARS-CoV-2 PCR (24 hour In-House): Collect NP swab in VTM [[id number]] Collected: 12/03/21 0510    Order Status: Completed Specimen: Respirate from Mid-Turbinate Updated: 12/03/21 1717     SARS-CoV-2 Source NP Swab     SARS-CoV-2 by PCR NotDetected     Comment: PATIENTS: Important information regarding your results and instructions can  be found at [email or web address]   \"After your  Covid-19 Test\"    [facility] providers: PLEASE REFER TO DE-ESCALATION AND RETESTING PROTOCOL  on [email or web address]    **The Roche SARS-CoV-2 RT-PCR test has been made available for use under the  Emergency Use Authorization (EUA) only.         Narrative:      Collected By:  Have you been in close contact with a person who is suspected  or known to be positive for COVID-19 within the last 30 days  (e.g. last seen " that person < 30 days ago)->Unknown    URINALYSIS [[id number]]  (Abnormal) Collected: 12/03/21 0346    Order Status: Completed Specimen: Urine Updated: 12/03/21 0410     Color DK Yellow     Character Cloudy     Specific Gravity 1.030     Ph 5.0     Glucose Negative mg/dL      Ketones Trace mg/dL      Protein 100 mg/dL      Bilirubin Negative     Urobilinogen, Urine 1.0     Nitrite Negative     Leukocyte Esterase Negative     Occult Blood Negative     Micro Urine Req Microscopic    Narrative:      Collected By:  Indication for culture:->Evaluation for sepsis without a  clear source of infection               ASSESSMENT/PLAN:   POD #2 S/P Left ankle arthrotomy with irrigation and debridement and Left FHL tenolysis for left septic ankle and left septic FHL tendon sheath by Dr. Hopper on 12/7/2021.    - Incisions well approximated and closed without drainage  - OK to discharge from LPS perspective  - CAM boot while ambulating  - Continue dry gauze dressings, patient instructed on wound care upon discharge  - [facility] for [facility]x treatment and ART therapy  - Follow up with Dr. Hopper on discharge for post surgical care      Wound care:   -Wound care orders updated for nursing  -Left foot: Adaptic, gauze, ACE wrap    Imaging/Labs:  -COVID-19: not detected this admission    Vascular status:   -palpable pedal pulses bilaterally     Surgery:   --no further surgeries planned at this time    Antibiotics:   -ID involved    Weight Bearing Status:   -Weight bearing as tolerated    Offloading:   -Offloading boot  when ambulating; at bedside  -Orthotic company:     PT/OT:   -Defer to medicine    Diabetes Education:   No history of DM    DISCHARGE PLAN:    Disposition:     To shelter with close follow up    Follow-up: Dr. Hopper, sutures to be removed approximately 3 weeks post-op  Follow up LPS rounds: Not indicated       Discussed with: pt, RN, Dr. Hopper, Dr. Chan      Please note that this dictation was  created using voice recognition software. I have  worked with technical experts from [facility] to optimize the interface.  I have made every reasonable attempt to correct obvious errors, but there may be errors of grammar and possibly content that I did not discover before finalizing the note.      Randy Christie M.D.    If any questions or concerns, please contact LPS through voalte.

## 2021-12-09 NOTE — CARE PLAN
The patient is Stable - Low risk of patient condition declining or worsening    Shift Goals  Clinical Goals: Pain management  Patient Goals: Pain control  Family Goals: N/A    Progress made toward(s) clinical / shift goals:  Pain controlled with PRN medications.      Problem: Pain - Standard  Goal: Alleviation of pain or a reduction in pain to the patient’s comfort goal  Outcome: Progressing     Problem: Knowledge Deficit - Standard  Goal: Patient and family/care givers will demonstrate understanding of plan of care, disease process/condition, diagnostic tests and medications  Outcome: Progressing     Problem: Hemodynamics  Goal: Patient's hemodynamics, fluid balance and neurologic status will be stable or improve  Outcome: Progressing       Patient is not progressing towards the following goals: N/A

## 2021-12-10 LAB
BACTERIA WND AEROBE CULT: NORMAL
GRAM STN SPEC: NORMAL
SIGNIFICANT IND 70042: NORMAL
SITE SITE: NORMAL
SOURCE SOURCE: NORMAL

## 2021-12-12 LAB
BACTERIA SPEC ANAEROBE CULT: NORMAL
SIGNIFICANT IND 70042: NORMAL
SITE SITE: NORMAL
SOURCE SOURCE: NORMAL

## 2021-12-21 NOTE — DOCUMENTATION QUERY
[facility]                                                                       Query Response Note      PATIENT:               UNRULY KOWALSKI  ACCT #:                  [id number]  MRN:                     [id number]  :                      1975  ADMIT DATE:       12/3/2021 1:34 AM  DISCH DATE:        2021 2:30 PM  RESPONDING  PROVIDER #:        [id number]           QUERY TEXT:    Uncontrolled diabetes mellitus with hyperglycemia is documented in the H&P and discharge summary, however in the rest of the chart just hyperglycemia is documented with patient having no history of diabetes. can the diagnosis of diabetes be clarified?    NOTE:  If an appropriate response is not listed below, please respond with a new note.       The patient's Clinical Indicators include:  clinical-H&P-Uncontrolled diabetes mellitus with hyperglycemia (HCC)-RISS  consult -He does not have any diabetes or other systemic illnesses.  Progress note 7-Apjcjgjhpakbu-Ujaiglltdwowy on admission.Patient denies history of diabetes.  D/S-Uncontrolled diabetes mellitus with hyperglycemia (HCC) POA: Unknown    treatment-A1c- 6.   SSI and Accu-Cheks    risks-Sepsis,HIV,neuropathy    [email or web address]  Options provided:   -- Diabetes mellitus with hyperglycemia is ruled in   -- Diabetes mellitus is  ruled out - hyperglycemia only   -- Unable to determine      Query created by: Renee Naidu on 2021 8:04 AM    RESPONSE TEXT:    Diabetes mellitus with hyperglycemia is ruled in          Electronically signed by:  YAHAIRA LAGUERRE MD 2021 3:16 PM

## 2022-01-03 LAB
FUNGUS SPEC CULT: NORMAL
FUNGUS SPEC FUNGUS STN: NORMAL
SIGNIFICANT IND 70042: NORMAL
SITE SITE: NORMAL
SOURCE SOURCE: NORMAL

## (undated) DEVICE — LACTATED RINGERS INJ 1000 ML - (14EA/CA 60CA/PF)

## (undated) DEVICE — SET EXTENSION WITH 2 PORTS (48EA/CA) ***PART #2C8610 IS A SUBSTITUTE*****

## (undated) DEVICE — GLOVE BIOGEL PI ORTHO SZ 7.5 PF LF (40PR/BX)

## (undated) DEVICE — MASK ANESTHESIA ADULT  - (100/CA)

## (undated) DEVICE — PACK LOWER EXTREMITY - (2/CA)

## (undated) DEVICE — GOWN SURGEONS LARGE - (32/CA)

## (undated) DEVICE — SODIUM CHL IRRIGATION 0.9% 1000ML (12EA/CA)

## (undated) DEVICE — GLOVE BIOGEL INDICATOR SZ 7.5 SURGICAL PF LTX - (50PR/BX 4BX/CA)

## (undated) DEVICE — GLOVE SIZE 7.0 SURGEON ACCELERATOR FREE GREEN (50PR/BX 4BX/CA)

## (undated) DEVICE — GLOVE BIOGEL SZ 8 SURGICAL PF LTX - (50PR/BX 4BX/CA)

## (undated) DEVICE — BLADE SURGICAL #15 - (50/BX 3BX/CA)

## (undated) DEVICE — SLEEVE VASO CALF MED - (10PR/CA)

## (undated) DEVICE — SCRUB SOLUTION EXIDINE 4% 40Z - 48/CS CHLORAHEXADINE GLUCONATE

## (undated) DEVICE — GOWN SURGEONS X-LARGE - DISP. (30/CA)

## (undated) DEVICE — GLOVE BIOGEL INDICATOR SZ 8.5 SURGICAL PF LTX - (50/BX 4BX/CA)

## (undated) DEVICE — PADDING CAST 6 IN STERILE - 6 X 4 YDS (24/CA)

## (undated) DEVICE — BANDAGE ELASTIC 4 HONEYCOMB - 4"X5YD LF (20/CA)"

## (undated) DEVICE — PENCIL ELECTSURG 10FT BTN SWH - (50/CA)

## (undated) DEVICE — HEAD HOLDER JUNIOR/ADULT

## (undated) DEVICE — SUTURE GENERAL

## (undated) DEVICE — GOWN WARMING STANDARD FLEX - (30/CA)

## (undated) DEVICE — PADDING CAST 4 IN STERILE - 4 X 4 YDS (24/CA)

## (undated) DEVICE — TOWEL STOP TIMEOUT SAFETY FLAG (40EA/CA)

## (undated) DEVICE — KIT ANESTHESIA W/CIRCUIT & 3/LT BAG W/FILTER (20EA/CA)

## (undated) DEVICE — CANISTER SUCTION 3000ML MECHANICAL FILTER AUTO SHUTOFF MEDI-VAC NONSTERILE LF DISP  (40EA/CA)

## (undated) DEVICE — SENSOR SPO2 NEO LNCS ADHESIVE (20/BX) SEE USER NOTES

## (undated) DEVICE — ELECTRODE 850 FOAM ADHESIVE - HYDROGEL RADIOTRNSPRNT (50/PK)

## (undated) DEVICE — SOD. CHL. INJ. 0.9% 1000 ML - (14EA/CA 60CA/PF)

## (undated) DEVICE — PADDING CAST 4 IN X 4 YDS - SOF-ROLL (12RL/BG 6BG/CT)

## (undated) DEVICE — TUBING CLEARLINK DUO-VENT - C-FLO (48EA/CA)

## (undated) DEVICE — SUTURE 3-0 ETHILON PS-1 (36PK/BX)

## (undated) DEVICE — SET IRRIGATION CYSTOSCOPY TUBE L80 IN (20EA/CA)

## (undated) DEVICE — NEPTUNE 4 PORT MANIFOLD - (20/PK)

## (undated) DEVICE — SUTURE 3-0 MONOCRYL SH (36PK/BX)

## (undated) DEVICE — ELECTRODE DUAL RETURN W/ CORD - (50/PK)

## (undated) DEVICE — SWAB ANAEROBIC SPEC.COLLECTOR - (25/PK 4PK/CA 100EA/CA)

## (undated) DEVICE — BANDAGE ELASTIC 6 HONEYCOMB - 6X5YD LF (20/CA)"

## (undated) DEVICE — DRESSING 3X8 ADAPTIC GAUZE - NON-ADHERING (36/PK 6PK/BX)

## (undated) DEVICE — CHLORAPREP 26 ML APPLICATOR - ORANGE TINT(25/CA)

## (undated) DEVICE — PROTECTOR ULNA NERVE - (36PR/CA)

## (undated) DEVICE — SET LEADWIRE 5 LEAD BEDSIDE DISPOSABLE ECG (1SET OF 5/EA)

## (undated) DEVICE — GLOVE SZ 7 BIOGEL PI MICRO - PF LF (50PR/BX 4BX/CA)

## (undated) DEVICE — WRAP CO-FLEX 4IN X 5YD STERIL - SELF-ADHERENT (18/CA)

## (undated) DEVICE — GLOVE SZ 6.5 BIOGEL PI MICRO - PF LF (50PR/BX)

## (undated) DEVICE — SWAB CULTURE AMIES ESWAB (50EA/PK)

## (undated) DEVICE — CUP DENTURE W/ LID - (200/CA)

## (undated) DEVICE — SUCTION INSTRUMENT YANKAUER BULBOUS TIP W/O VENT (50EA/CA)